# Patient Record
Sex: MALE | Race: WHITE | NOT HISPANIC OR LATINO
[De-identification: names, ages, dates, MRNs, and addresses within clinical notes are randomized per-mention and may not be internally consistent; named-entity substitution may affect disease eponyms.]

---

## 2017-02-06 ENCOUNTER — RX RENEWAL (OUTPATIENT)
Age: 67
End: 2017-02-06

## 2017-02-06 ENCOUNTER — MEDICATION RENEWAL (OUTPATIENT)
Age: 67
End: 2017-02-06

## 2017-02-10 ENCOUNTER — APPOINTMENT (OUTPATIENT)
Age: 67
End: 2017-02-10

## 2017-02-10 ENCOUNTER — APPOINTMENT (OUTPATIENT)
Dept: UROLOGY | Facility: CLINIC | Age: 67
End: 2017-02-10

## 2017-02-10 VITALS
WEIGHT: 194 LBS | DIASTOLIC BLOOD PRESSURE: 78 MMHG | BODY MASS INDEX: 27.77 KG/M2 | HEART RATE: 68 BPM | TEMPERATURE: 97.5 F | HEIGHT: 70 IN | SYSTOLIC BLOOD PRESSURE: 145 MMHG | RESPIRATION RATE: 17 BRPM

## 2017-02-12 ENCOUNTER — TRANSCRIPTION ENCOUNTER (OUTPATIENT)
Age: 67
End: 2017-02-12

## 2017-06-28 ENCOUNTER — RX RENEWAL (OUTPATIENT)
Age: 67
End: 2017-06-28

## 2017-06-28 ENCOUNTER — MEDICATION RENEWAL (OUTPATIENT)
Age: 67
End: 2017-06-28

## 2017-08-11 ENCOUNTER — APPOINTMENT (OUTPATIENT)
Dept: UROLOGY | Facility: CLINIC | Age: 67
End: 2017-08-11
Payer: MEDICARE

## 2017-08-11 VITALS
DIASTOLIC BLOOD PRESSURE: 91 MMHG | SYSTOLIC BLOOD PRESSURE: 125 MMHG | HEIGHT: 70 IN | HEART RATE: 76 BPM | WEIGHT: 195 LBS | BODY MASS INDEX: 27.92 KG/M2

## 2017-08-11 PROCEDURE — 99213 OFFICE O/P EST LOW 20 MIN: CPT

## 2017-08-15 ENCOUNTER — APPOINTMENT (OUTPATIENT)
Age: 67
End: 2017-08-15

## 2018-02-06 ENCOUNTER — APPOINTMENT (OUTPATIENT)
Age: 68
End: 2018-02-06

## 2018-02-06 ENCOUNTER — APPOINTMENT (OUTPATIENT)
Dept: UROLOGY | Facility: CLINIC | Age: 68
End: 2018-02-06
Payer: MEDICARE

## 2018-02-06 PROCEDURE — 51798 US URINE CAPACITY MEASURE: CPT

## 2018-02-06 PROCEDURE — 99213 OFFICE O/P EST LOW 20 MIN: CPT

## 2018-02-13 ENCOUNTER — RX RENEWAL (OUTPATIENT)
Age: 68
End: 2018-02-13

## 2018-06-25 ENCOUNTER — RX RENEWAL (OUTPATIENT)
Age: 68
End: 2018-06-25

## 2018-08-24 ENCOUNTER — OTHER (OUTPATIENT)
Age: 68
End: 2018-08-24

## 2018-08-24 ENCOUNTER — APPOINTMENT (OUTPATIENT)
Dept: UROLOGY | Facility: CLINIC | Age: 68
End: 2018-08-24
Payer: MEDICARE

## 2018-08-24 VITALS
SYSTOLIC BLOOD PRESSURE: 112 MMHG | WEIGHT: 194 LBS | HEART RATE: 66 BPM | BODY MASS INDEX: 27.77 KG/M2 | HEIGHT: 70 IN | DIASTOLIC BLOOD PRESSURE: 69 MMHG | TEMPERATURE: 97.5 F | RESPIRATION RATE: 17 BRPM

## 2018-08-24 PROCEDURE — 99213 OFFICE O/P EST LOW 20 MIN: CPT

## 2018-09-24 ENCOUNTER — RX RENEWAL (OUTPATIENT)
Age: 68
End: 2018-09-24

## 2018-12-19 ENCOUNTER — RX RENEWAL (OUTPATIENT)
Age: 68
End: 2018-12-19

## 2019-04-10 ENCOUNTER — RX RENEWAL (OUTPATIENT)
Age: 69
End: 2019-04-10

## 2019-08-20 ENCOUNTER — APPOINTMENT (OUTPATIENT)
Dept: UROLOGY | Facility: CLINIC | Age: 69
End: 2019-08-20
Payer: MEDICARE

## 2019-08-20 VITALS
WEIGHT: 193 LBS | HEART RATE: 77 BPM | HEIGHT: 70 IN | SYSTOLIC BLOOD PRESSURE: 120 MMHG | DIASTOLIC BLOOD PRESSURE: 85 MMHG | RESPIRATION RATE: 17 BRPM | TEMPERATURE: 97.7 F | BODY MASS INDEX: 27.63 KG/M2

## 2019-08-20 PROCEDURE — 99213 OFFICE O/P EST LOW 20 MIN: CPT

## 2019-08-22 LAB — PSA SERPL-MCNC: 1.13 NG/ML

## 2019-08-22 NOTE — ASSESSMENT
[FreeTextEntry1] : Patient is a 68 yo M here for f/u with BPH/LUTS. Previous elevated PSA has been lower and stable on recent repeats.\par \par -cont flomax and dutasteride\par -discussed with pt option for cialis daily - for BPH and ED.  Discussed with pt proper use and side effect profile, including risk of priapism, particularly since he is taking trazadone\par -after discussion he does not want to take any viagra or cialis at this time\par -d/w pt that he may have low testosterone, would consider am T level.  He is not interested in testosterone supplementation.  Therefore at this time would not pursue T levels\par -he wishes to think further\par -F/u 3 mos

## 2019-08-22 NOTE — PHYSICAL EXAM
[General Appearance - Well Nourished] : well nourished [General Appearance - Well Developed] : well developed [Normal Appearance] : normal appearance [Well Groomed] : well groomed [General Appearance - In No Acute Distress] : no acute distress [Oriented To Time, Place, And Person] : oriented to person, place, and time [Affect] : the affect was normal [Mood] : the mood was normal [Not Anxious] : not anxious

## 2019-08-22 NOTE — ADDENDUM
[FreeTextEntry1] : Also d/w pt dutasteride could potentially be source for his low libido\par He wishes trial off dutasteride - he will follow up in 4 mos to assess for change in libido\par PSA is stable

## 2019-08-22 NOTE — HISTORY OF PRESENT ILLNESS
[FreeTextEntry1] : Patient is a 66 yo M who presents for f/u with BPH, mildly elevated PSA of 4.4.  Pt is a former pt of Dr Chavis's, however due to insurance is now transferring care to me. He has a longstanding h/o BPH and elevated PSA.  He had been on alpha blockers before but did not feel they were helpful and stopped Rapaflo due to ejaculatory side effects.  He was very bothered by ejaculatory side effects from Rapaflo, however he feels that many yrs when he tried flomax he did not have as significant ejaculatory side effects.. From Dr Chavis, His prostate is @90cc in volume and in 2015 PSA 4.4 with 18% free. \par \par Pt was previously was other regimens for BPH -  rapaflo and finasteride, but experienced increased pain in perineum/pelvis, particularly with ejaculation on rapaflo and finasteride.  Did not have this on flomax bid/avodart.  But had dizziness on bid flomax.\par \par He then switched to flomax once daily and avodart.  Since then he reports no side effects on this medication regimen and excellent urinary stream and emptying.  \par \par PSA in 7/2017 is 1.2 (on dutasteride).\par PSA in 7/2018 is 1.1 (on dustateride)\par \par He is here for f/u.  Remains on flomax and avodart daily.  Reports improved LUTS - good stream except first void, good emptying. Improved frequency.  Nocturia x0-1.  Overall LUTS stable.\par \par Mild ED - reports less rigid erections, but he can maintain to completion.  No use of ED meds.  His libido is not as strong as it used to be, but mainly his erections are not as firm.  He does orgasm and issue more is penetration.  No nitrates, no CP.  He was seen by a Cardiologist - had normal stress test.  He did recently start on trazadone for sleeplessness.  He reports 3-4 nights of not sleeping.

## 2019-09-04 RX ADMIN — OXYCODONE AND ACETAMINOPHEN 2 TABLET(S): 5; 325 TABLET ORAL at 23:00

## 2019-09-29 ENCOUNTER — INPATIENT (INPATIENT)
Facility: HOSPITAL | Age: 69
LOS: 5 days | Discharge: HOME | DRG: 175 | End: 2019-10-05
Attending: HOSPITALIST | Admitting: HOSPITALIST
Payer: MEDICARE

## 2019-09-29 VITALS
WEIGHT: 190.04 LBS | OXYGEN SATURATION: 99 % | DIASTOLIC BLOOD PRESSURE: 80 MMHG | TEMPERATURE: 98 F | SYSTOLIC BLOOD PRESSURE: 115 MMHG | HEIGHT: 71 IN | HEART RATE: 107 BPM | RESPIRATION RATE: 20 BRPM

## 2019-09-29 DIAGNOSIS — I26.02 SADDLE EMBOLUS OF PULMONARY ARTERY WITH ACUTE COR PULMONALE: ICD-10-CM

## 2019-09-29 LAB
ALBUMIN SERPL ELPH-MCNC: 3.7 G/DL — SIGNIFICANT CHANGE UP (ref 3.3–5.2)
ALP SERPL-CCNC: 58 U/L — SIGNIFICANT CHANGE UP (ref 40–120)
ALT FLD-CCNC: 29 U/L — SIGNIFICANT CHANGE UP
ANION GAP SERPL CALC-SCNC: 12 MMOL/L — SIGNIFICANT CHANGE UP (ref 5–17)
APTT BLD: 24.4 SEC — LOW (ref 27.5–36.3)
AST SERPL-CCNC: 28 U/L — SIGNIFICANT CHANGE UP
BILIRUB SERPL-MCNC: 0.3 MG/DL — LOW (ref 0.4–2)
BLD GP AB SCN SERPL QL: SIGNIFICANT CHANGE UP
BUN SERPL-MCNC: 18 MG/DL — SIGNIFICANT CHANGE UP (ref 8–20)
CALCIUM SERPL-MCNC: 8.9 MG/DL — SIGNIFICANT CHANGE UP (ref 8.6–10.2)
CHLORIDE SERPL-SCNC: 103 MMOL/L — SIGNIFICANT CHANGE UP (ref 98–107)
CO2 SERPL-SCNC: 21 MMOL/L — LOW (ref 22–29)
CREAT SERPL-MCNC: 1.06 MG/DL — SIGNIFICANT CHANGE UP (ref 0.5–1.3)
GLUCOSE SERPL-MCNC: 126 MG/DL — HIGH (ref 70–115)
HCT VFR BLD CALC: 45.7 % — SIGNIFICANT CHANGE UP (ref 39–50)
HGB BLD-MCNC: 15.2 G/DL — SIGNIFICANT CHANGE UP (ref 13–17)
INR BLD: 1.03 RATIO — SIGNIFICANT CHANGE UP (ref 0.88–1.16)
MCHC RBC-ENTMCNC: 30.6 PG — SIGNIFICANT CHANGE UP (ref 27–34)
MCHC RBC-ENTMCNC: 33.3 GM/DL — SIGNIFICANT CHANGE UP (ref 32–36)
MCV RBC AUTO: 92 FL — SIGNIFICANT CHANGE UP (ref 80–100)
NT-PROBNP SERPL-SCNC: 31 PG/ML — SIGNIFICANT CHANGE UP (ref 0–300)
PLATELET # BLD AUTO: 219 K/UL — SIGNIFICANT CHANGE UP (ref 150–400)
POTASSIUM SERPL-MCNC: 4.2 MMOL/L — SIGNIFICANT CHANGE UP (ref 3.5–5.3)
POTASSIUM SERPL-SCNC: 4.2 MMOL/L — SIGNIFICANT CHANGE UP (ref 3.5–5.3)
PROT SERPL-MCNC: 6.9 G/DL — SIGNIFICANT CHANGE UP (ref 6.6–8.7)
PROTHROM AB SERPL-ACNC: 11.8 SEC — SIGNIFICANT CHANGE UP (ref 10–12.9)
RBC # BLD: 4.97 M/UL — SIGNIFICANT CHANGE UP (ref 4.2–5.8)
RBC # FLD: 13.2 % — SIGNIFICANT CHANGE UP (ref 10.3–14.5)
SODIUM SERPL-SCNC: 136 MMOL/L — SIGNIFICANT CHANGE UP (ref 135–145)
TROPONIN T SERPL-MCNC: 0.08 NG/ML — HIGH (ref 0–0.06)
WBC # BLD: 12.86 K/UL — HIGH (ref 3.8–10.5)
WBC # FLD AUTO: 12.86 K/UL — HIGH (ref 3.8–10.5)

## 2019-09-29 PROCEDURE — 99232 SBSQ HOSP IP/OBS MODERATE 35: CPT

## 2019-09-29 PROCEDURE — 93010 ELECTROCARDIOGRAM REPORT: CPT

## 2019-09-29 PROCEDURE — 70450 CT HEAD/BRAIN W/O DYE: CPT | Mod: 26

## 2019-09-29 PROCEDURE — 99291 CRITICAL CARE FIRST HOUR: CPT

## 2019-09-29 PROCEDURE — 71045 X-RAY EXAM CHEST 1 VIEW: CPT | Mod: 26

## 2019-09-29 PROCEDURE — 71275 CT ANGIOGRAPHY CHEST: CPT | Mod: 26

## 2019-09-29 PROCEDURE — 99221 1ST HOSP IP/OBS SF/LOW 40: CPT

## 2019-09-29 RX ORDER — SODIUM CHLORIDE 9 MG/ML
1000 INJECTION INTRAMUSCULAR; INTRAVENOUS; SUBCUTANEOUS ONCE
Refills: 0 | Status: COMPLETED | OUTPATIENT
Start: 2019-09-29 | End: 2019-09-29

## 2019-09-29 RX ORDER — HEPARIN SODIUM 5000 [USP'U]/ML
6500 INJECTION INTRAVENOUS; SUBCUTANEOUS ONCE
Refills: 0 | Status: COMPLETED | OUTPATIENT
Start: 2019-09-29 | End: 2019-09-29

## 2019-09-29 RX ORDER — HEPARIN SODIUM 5000 [USP'U]/ML
7000 INJECTION INTRAVENOUS; SUBCUTANEOUS EVERY 6 HOURS
Refills: 0 | Status: DISCONTINUED | OUTPATIENT
Start: 2019-09-29 | End: 2019-09-29

## 2019-09-29 RX ORDER — HEPARIN SODIUM 5000 [USP'U]/ML
7000 INJECTION INTRAVENOUS; SUBCUTANEOUS ONCE
Refills: 0 | Status: DISCONTINUED | OUTPATIENT
Start: 2019-09-29 | End: 2019-09-29

## 2019-09-29 RX ORDER — HEPARIN SODIUM 5000 [USP'U]/ML
INJECTION INTRAVENOUS; SUBCUTANEOUS
Qty: 25000 | Refills: 0 | Status: DISCONTINUED | OUTPATIENT
Start: 2019-09-29 | End: 2019-09-29

## 2019-09-29 RX ORDER — TAMSULOSIN HYDROCHLORIDE 0.4 MG/1
1 CAPSULE ORAL
Qty: 0 | Refills: 0 | DISCHARGE

## 2019-09-29 RX ORDER — CHLORHEXIDINE GLUCONATE 213 G/1000ML
1 SOLUTION TOPICAL
Refills: 0 | Status: DISCONTINUED | OUTPATIENT
Start: 2019-09-29 | End: 2019-09-29

## 2019-09-29 RX ORDER — OMEPRAZOLE 10 MG/1
1 CAPSULE, DELAYED RELEASE ORAL
Qty: 0 | Refills: 0 | DISCHARGE

## 2019-09-29 RX ORDER — HEPARIN SODIUM 5000 [USP'U]/ML
INJECTION INTRAVENOUS; SUBCUTANEOUS
Qty: 25000 | Refills: 0 | Status: DISCONTINUED | OUTPATIENT
Start: 2019-09-29 | End: 2019-09-30

## 2019-09-29 RX ORDER — ROSUVASTATIN CALCIUM 5 MG/1
1 TABLET ORAL
Qty: 0 | Refills: 0 | DISCHARGE

## 2019-09-29 RX ORDER — HEPARIN SODIUM 5000 [USP'U]/ML
6500 INJECTION INTRAVENOUS; SUBCUTANEOUS EVERY 6 HOURS
Refills: 0 | Status: DISCONTINUED | OUTPATIENT
Start: 2019-09-29 | End: 2019-09-30

## 2019-09-29 RX ORDER — HEPARIN SODIUM 5000 [USP'U]/ML
3000 INJECTION INTRAVENOUS; SUBCUTANEOUS EVERY 6 HOURS
Refills: 0 | Status: DISCONTINUED | OUTPATIENT
Start: 2019-09-29 | End: 2019-09-30

## 2019-09-29 RX ORDER — HEPARIN SODIUM 5000 [USP'U]/ML
3500 INJECTION INTRAVENOUS; SUBCUTANEOUS EVERY 6 HOURS
Refills: 0 | Status: DISCONTINUED | OUTPATIENT
Start: 2019-09-29 | End: 2019-09-29

## 2019-09-29 RX ORDER — DUTASTERIDE 0.5 MG/1
0.8 CAPSULE, LIQUID FILLED ORAL
Qty: 0 | Refills: 0 | DISCHARGE

## 2019-09-29 RX ORDER — TRAZODONE HCL 50 MG
0 TABLET ORAL
Qty: 0 | Refills: 0 | DISCHARGE

## 2019-09-29 RX ADMIN — HEPARIN SODIUM 1500 UNIT(S)/HR: 5000 INJECTION INTRAVENOUS; SUBCUTANEOUS at 21:21

## 2019-09-29 RX ADMIN — HEPARIN SODIUM 6500 UNIT(S): 5000 INJECTION INTRAVENOUS; SUBCUTANEOUS at 21:19

## 2019-09-29 RX ADMIN — SODIUM CHLORIDE 1000 MILLILITER(S): 9 INJECTION INTRAMUSCULAR; INTRAVENOUS; SUBCUTANEOUS at 21:00

## 2019-09-29 RX ADMIN — SODIUM CHLORIDE 1000 MILLILITER(S): 9 INJECTION INTRAMUSCULAR; INTRAVENOUS; SUBCUTANEOUS at 21:38

## 2019-09-29 NOTE — ED PROVIDER NOTE - PHYSICAL EXAMINATION
General:     NAD  Head:     NC/AT, EOMI, oral mucosa moist  Neck:     trachea midline  Lungs:     CTA b/l, no w/r/r  CVS:     S1S2, tachycardic, no m/g/r  Abd:     +BS, s/nt/nd, no organomegaly  Ext:    2+ radial and pedal pulses, no c/c/e  Neuro: AAOx3, no sensory/motor deficits

## 2019-09-29 NOTE — CONSULT NOTE ADULT - PROBLEM SELECTOR RECOMMENDATION 9
-admit to MICU  -currently hemodynamically stable, oxygenating well on nasal cannula  -Alteplase infusion followed by heparin gtt  -TTE in am  -b/l LE venous dopplers

## 2019-09-29 NOTE — ED PROVIDER NOTE - CLINICAL SUMMARY MEDICAL DECISION MAKING FREE TEXT BOX
Patient arrived with near syncope, cp/sob. found to have saddle embolus, called MICU and Dr. Ballard for EKOS.  patient to be admitted to MICU and possible candidate for EKOS/TPA

## 2019-09-29 NOTE — ED ADULT NURSE NOTE - NEURO WDL
Alert and oriented to person, place and time, memory intact, behavior appropriate to situation, PERRL.
unsure

## 2019-09-29 NOTE — CONSULT NOTE ADULT - SUBJECTIVE AND OBJECTIVE BOX
History obtained by: Patient and medical record     obtained: No    Chief complaint:  "I was dizzy and couldn't breathe"    HPI:  This is 67 y/o male with hx of HTN, HLD, BPH, GERD who presents with dizziness and labored breathing. Pt was cooking dinner outside on the grill when he suddenly felt dizzy and stumbled back to the kitchen. He went down on his knees and felt SOB and diaphoretic, denies chest pain but endorses mild pressure around his neck. In the ER pt found tachycardic to 110's and mildly hypoxemic, troponin 0.08. Chest CTA revealed extensive pulmonary emboli with right heart strain. Dr. Ballard contacted and recommended thrombolysis with TPA.     Pt has been feeling very tired recently and had a low grade fever last week. He's been having non-productive cough for several weeks. Pt denies chest pain, palpitations or syncope, no hx of clotting disorders. He recently had a cardiac workup at [facility] including stress test and echocardiogram (reportedly normal). Pt has been on multiply flights over the past few months, last trip about 10 days ago. He also c/o new LLE pain.      REVIEW OF SYMPTOMS:   Cardiovascular:  as per HPI  Respiratory:  c/o dyspnea and cough,     Genitourinary:  No dysuria, no hematuria;   Gastrointestinal:   No dark color stool, no melena, no diarrhea, no constipation, no abdominal pain;   Neurological: No headache, no slurred speech, c/o dizziness  Psychiatric: No agitation, no anxiety.  ALL OTHER REVIEW OF SYSTEMS ARE NEGATIVE.    MEDICATIONS  (home):  Omeprazole 40 mg  Lisinopril 20 mg  Crestor 20 mg  ASA 81 mg  Dutasteride 0.8 mg  Flomax 0.4 mg  Trazodone 50 mg        PAST MEDICAL & SURGICAL HISTORY:  Hypertension      FAMILY HISTORY: father MI at 80 y/o      SOCIAL HISTORY: retired, lives with wife, exercises regularly at the gym    CIGARETTES: smoked for a few years in [place], quit 45 years ago    ALCOHOL: 1 martini a day    DRUGS: denies    Vital Signs Last 24 Hrs  T(C): 36.4 (29 Sep 2019 18:58), Max: 36.4 (29 Sep 2019 18:58)  T(F): 97.6 (29 Sep 2019 18:58), Max: 97.6 (29 Sep 2019 18:58)  HR: 120 (29 Sep 2019 20:54) (107 - 126)  BP: 114/78 (29 Sep 2019 20:54) (97/60 - 115/80)  BP(mean): --  RR: 20 (29 Sep 2019 20:54) (16 - 21)  SpO2: 98% (29 Sep 2019 20:54) (93% - 99%)    PHYSICAL EXAM:  General: WN/WD NAD  Neurology: A&Ox3, nonfocal, FRASER x 4  Eyes: PERRL/ EOMI, Gross vision intact  ENT/Neck: Neck supple, trachea midline, No JVD, Gross hearing intact  Respiratory: CTA B/L, No wheezing, rales, rhonchi  CV: tachycardia, RRR, S1S2, no gross murmurs  Abdominal: Soft, NT, ND +BS,   Extremities: No edema, + peripheral pulses  Skin: No Rashes, Hematoma, Ecchymosis        INTERPRETATION OF TELEMETRY: 's    ECG:    I&O's Detail      LABS:                        15.2   12.86 )-----------( 219      ( 29 Sep 2019 19:46 )             45.7     09-29    136  |  103  |  18.0  ----------------------------<  126<H>  4.2   |  21.0<L>  |  1.06    Ca    8.9      29 Sep 2019 19:46    TPro  6.9  /  Alb  3.7  /  TBili  0.3<L>  /  DBili  x   /  AST  28  /  ALT  29  /  AlkPhos  58  09-29    CARDIAC MARKERS ( 29 Sep 2019 19:46 )  x     / 0.08 ng/mL / x     / x     / x          PT/INR - ( 29 Sep 2019 19:46 )   PT: 11.8 sec;   INR: 1.03 ratio         PTT - ( 29 Sep 2019 19:46 )  PTT:24.4 sec    I&O's Summary      RADIOLOGY & ADDITIONAL STUDIES:   < from: CT Angio Chest w/ IV Cont (09.29.19 @ 20:43) >  IMPRESSION:  Saddle pulmonaryembolism with extensive bilateral pulmonary emboli.   Right heart strain. No evidence of pulmonary infarct at this time.    I discussed the findings with Dr. Celestin at 8:48 PM on 9/29/2019 with read   back verification.      ADAM PRIETO M.D., RADIOLOGIST  This document has been electronically signed. Sep 29 2019  8:52PM    < end of copied text >     PREVIOUS DIAGNOSTIC TESTING:      ECHO: n/a      STRESS: n/a        CATHETERIZATION: n/a

## 2019-09-29 NOTE — CONSULT NOTE ADULT - ATTENDING COMMENTS
Patient with massive PE based on hypotension, hypoxemia, near syncopal episode and significant size of thrombus.   Decision to give full dose TPA.   Patient tolerated well. No bleeding events.   Plan for eliquis 10 mg po bid with 2 hour heparin overlap.   Change to eliquis 5 mg po bid after initial loading dose for 7 days.

## 2019-09-29 NOTE — ED ADULT NURSE NOTE - OBJECTIVE STATEMENT
ASsumed care of patient at 1945, alert and oriented x4, c/o dizziness at home with near syncopal episode. Pt reports he became SOB and "couldn't stand". Denies LOC, denies chest pains. Pt on CM, sinus tachycardia noted. Sp02 98% on 2L NC. No respiratory distress noted at this time. Pt reports he traveled to [facility] in may and was SOB while on vacation. PT seen by MD. IV placed labs sent IVF infusing. Wife at bedside. pt educated on plan of care, pt able to successfully teach back plan of care to RN, RN will continue to reeducate pt during hospital stay.

## 2019-09-29 NOTE — ED PROVIDER NOTE - NS ED ROS FT
Constitutional: (-) fever  (-)chills  (-)sweats  Eyes/ENT: (-) blurry vision, (-) epistaxis  (-)rhinorrhea   (-) sore throat    Cardiovascular: (+) chest pain, (+) palpitations (-) edema   Respiratory: (-) cough, (+) shortness of breath   Gastrointestinal: (-)nausea  (-)vomiting, (-) diarrhea  (-) abdominal pain   :  (-)dysuria, (-)frequency, (-)urgency, (-)hematuria  Musculoskeletal: (-) neck pain, (-) back pain, (-) joint pain  Integumentary: (-) rash, (-) edema  Neurological: (-) headache, (-) altered mental status  (-)LOC +near syncope

## 2019-09-29 NOTE — ED ADULT TRIAGE NOTE - CHIEF COMPLAINT QUOTE
pt a+ox3, reports sudden onset of dizziness after working outside. pt denies syncope, LOC, hitting head. pt reports feeling weak, reports fever last week but states he has been feeling fine since.

## 2019-09-29 NOTE — ED ADULT NURSE REASSESSMENT NOTE - NS ED NURSE REASSESS COMMENT FT1
Pt to critical care after CT. + PE on scan. Report given to Critical care RN Roberto.
Pt A&Ox4 c/o having a near syncope episode and SOB at this time. Pt resting comfortably, VSS, no signs of distress at this time, CM in place ST, Heparin started as ordered, PTT/INR @ 250, awaiting consult/ admission, safety maintained, call bell in reach.

## 2019-09-29 NOTE — ED PROVIDER NOTE - OBJECTIVE STATEMENT
68yoM; with pmh signif for HTN; now p/w near-syncope. patient states he has had sinus infection for 1 week, placed on doxycycline and steroids.  states today he was squatting to fix something and when he stood up he felt vertiginous and had near syncope. states he felt diaphoretic, nausea, chest pain and sob.  chest pain--sscp, pressure, associated with sob.  denies back pain. denies paresthesia. denies focal weakness. denies abd pain. denies smoking. denies trauma or recent surgery.  reports traveling to [facility] in May.  PMH: HTN  SOCIAL: No tobacco/illicit substance use/socialEtOH 68yoM; with pmh signif for HTN; now p/w near-syncope. patient states he has had sinus infection for 1 week, placed on doxycycline and steroids.  states today he was squatting to fix something and when he stood up he felt vertiginous and had near syncope. Near syncope associated with diaphoretic, nausea, chest pain and sob.  chest pain--sscp, pressure, associated with sob.  denies back pain. denies paresthesia. denies focal weakness. denies abd pain. denies smoking. denies trauma or recent surgery.  reports traveling to [facility] in May.  PMH: HTN  SOCIAL: No tobacco/illicit substance use/socialEtOH

## 2019-09-30 DIAGNOSIS — I26.09 OTHER PULMONARY EMBOLISM WITH ACUTE COR PULMONALE: ICD-10-CM

## 2019-09-30 DIAGNOSIS — I82.409 ACUTE EMBOLISM AND THROMBOSIS OF UNSPECIFIED DEEP VEINS OF UNSPECIFIED LOWER EXTREMITY: ICD-10-CM

## 2019-09-30 DIAGNOSIS — R09.89 OTHER SPECIFIED SYMPTOMS AND SIGNS INVOLVING THE CIRCULATORY AND RESPIRATORY SYSTEMS: ICD-10-CM

## 2019-09-30 DIAGNOSIS — R05 COUGH: ICD-10-CM

## 2019-09-30 DIAGNOSIS — I26.99 OTHER PULMONARY EMBOLISM WITHOUT ACUTE COR PULMONALE: ICD-10-CM

## 2019-09-30 LAB
ABO RH CONFIRMATION: SIGNIFICANT CHANGE UP
APTT BLD: 159.6 SEC — CRITICAL HIGH (ref 27.5–36.3)
APTT BLD: 34.4 SEC — SIGNIFICANT CHANGE UP (ref 27.5–36.3)
APTT BLD: 72.2 SEC — HIGH (ref 27.5–36.3)
HCT VFR BLD CALC: 44.6 % — SIGNIFICANT CHANGE UP (ref 39–50)
HCT VFR BLD CALC: 44.8 % — SIGNIFICANT CHANGE UP (ref 39–50)
HCV AB S/CO SERPL IA: 0.16 S/CO — SIGNIFICANT CHANGE UP (ref 0–0.99)
HCV AB SERPL-IMP: SIGNIFICANT CHANGE UP
HGB BLD-MCNC: 14.7 G/DL — SIGNIFICANT CHANGE UP (ref 13–17)
HGB BLD-MCNC: 14.8 G/DL — SIGNIFICANT CHANGE UP (ref 13–17)
INR BLD: 1.08 RATIO — SIGNIFICANT CHANGE UP (ref 0.88–1.16)
MCHC RBC-ENTMCNC: 30.1 PG — SIGNIFICANT CHANGE UP (ref 27–34)
MCHC RBC-ENTMCNC: 30.4 PG — SIGNIFICANT CHANGE UP (ref 27–34)
MCHC RBC-ENTMCNC: 33 GM/DL — SIGNIFICANT CHANGE UP (ref 32–36)
MCHC RBC-ENTMCNC: 33 GM/DL — SIGNIFICANT CHANGE UP (ref 32–36)
MCV RBC AUTO: 91.2 FL — SIGNIFICANT CHANGE UP (ref 80–100)
MCV RBC AUTO: 92 FL — SIGNIFICANT CHANGE UP (ref 80–100)
PLATELET # BLD AUTO: 239 K/UL — SIGNIFICANT CHANGE UP (ref 150–400)
PLATELET # BLD AUTO: 240 K/UL — SIGNIFICANT CHANGE UP (ref 150–400)
PROTHROM AB SERPL-ACNC: 12.5 SEC — SIGNIFICANT CHANGE UP (ref 10–12.9)
RBC # BLD: 4.87 M/UL — SIGNIFICANT CHANGE UP (ref 4.2–5.8)
RBC # BLD: 4.89 M/UL — SIGNIFICANT CHANGE UP (ref 4.2–5.8)
RBC # FLD: 13.4 % — SIGNIFICANT CHANGE UP (ref 10.3–14.5)
RBC # FLD: 13.5 % — SIGNIFICANT CHANGE UP (ref 10.3–14.5)
WBC # BLD: 12.78 K/UL — HIGH (ref 3.8–10.5)
WBC # BLD: 14 K/UL — HIGH (ref 3.8–10.5)
WBC # FLD AUTO: 12.78 K/UL — HIGH (ref 3.8–10.5)
WBC # FLD AUTO: 14 K/UL — HIGH (ref 3.8–10.5)

## 2019-09-30 PROCEDURE — 99223 1ST HOSP IP/OBS HIGH 75: CPT

## 2019-09-30 PROCEDURE — 93931 UPPER EXTREMITY STUDY: CPT | Mod: 26,RT

## 2019-09-30 PROCEDURE — 93971 EXTREMITY STUDY: CPT | Mod: 26,RT

## 2019-09-30 PROCEDURE — 93970 EXTREMITY STUDY: CPT | Mod: 26

## 2019-09-30 RX ORDER — HEPARIN SODIUM 5000 [USP'U]/ML
3000 INJECTION INTRAVENOUS; SUBCUTANEOUS EVERY 6 HOURS
Refills: 0 | Status: DISCONTINUED | OUTPATIENT
Start: 2019-09-30 | End: 2019-09-30

## 2019-09-30 RX ORDER — HEPARIN SODIUM 5000 [USP'U]/ML
3000 INJECTION INTRAVENOUS; SUBCUTANEOUS EVERY 6 HOURS
Refills: 0 | Status: DISCONTINUED | OUTPATIENT
Start: 2019-09-30 | End: 2019-10-01

## 2019-09-30 RX ORDER — ALPRAZOLAM 0.25 MG
0.25 TABLET ORAL EVERY 8 HOURS
Refills: 0 | Status: DISCONTINUED | OUTPATIENT
Start: 2019-09-30 | End: 2019-10-05

## 2019-09-30 RX ORDER — HEPARIN SODIUM 5000 [USP'U]/ML
6500 INJECTION INTRAVENOUS; SUBCUTANEOUS EVERY 6 HOURS
Refills: 0 | Status: DISCONTINUED | OUTPATIENT
Start: 2019-09-30 | End: 2019-09-30

## 2019-09-30 RX ORDER — ALTEPLASE 100 MG
100 KIT INTRAVENOUS ONCE
Refills: 0 | Status: COMPLETED | OUTPATIENT
Start: 2019-09-30 | End: 2019-09-30

## 2019-09-30 RX ORDER — ACETAMINOPHEN 500 MG
650 TABLET ORAL ONCE
Refills: 0 | Status: COMPLETED | OUTPATIENT
Start: 2019-09-30 | End: 2019-09-30

## 2019-09-30 RX ORDER — HEPARIN SODIUM 5000 [USP'U]/ML
INJECTION INTRAVENOUS; SUBCUTANEOUS
Qty: 25000 | Refills: 0 | Status: DISCONTINUED | OUTPATIENT
Start: 2019-09-30 | End: 2019-09-30

## 2019-09-30 RX ORDER — INFLUENZA VIRUS VACCINE 15; 15; 15; 15 UG/.5ML; UG/.5ML; UG/.5ML; UG/.5ML
0.5 SUSPENSION INTRAMUSCULAR ONCE
Refills: 0 | Status: COMPLETED | OUTPATIENT
Start: 2019-09-30 | End: 2019-09-30

## 2019-09-30 RX ORDER — SIMETHICONE 80 MG/1
80 TABLET, CHEWABLE ORAL EVERY 8 HOURS
Refills: 0 | Status: DISCONTINUED | OUTPATIENT
Start: 2019-09-30 | End: 2019-10-05

## 2019-09-30 RX ORDER — OXYCODONE AND ACETAMINOPHEN 5; 325 MG/1; MG/1
1 TABLET ORAL ONCE
Refills: 0 | Status: DISCONTINUED | OUTPATIENT
Start: 2019-09-30 | End: 2019-09-30

## 2019-09-30 RX ORDER — HEPARIN SODIUM 5000 [USP'U]/ML
5000 INJECTION INTRAVENOUS; SUBCUTANEOUS ONCE
Refills: 0 | Status: COMPLETED | OUTPATIENT
Start: 2019-09-30 | End: 2019-09-30

## 2019-09-30 RX ORDER — HEPARIN SODIUM 5000 [USP'U]/ML
1100 INJECTION INTRAVENOUS; SUBCUTANEOUS
Qty: 25000 | Refills: 0 | Status: DISCONTINUED | OUTPATIENT
Start: 2019-09-30 | End: 2019-10-01

## 2019-09-30 RX ORDER — HEPARIN SODIUM 5000 [USP'U]/ML
6500 INJECTION INTRAVENOUS; SUBCUTANEOUS ONCE
Refills: 0 | Status: DISCONTINUED | OUTPATIENT
Start: 2019-09-30 | End: 2019-09-30

## 2019-09-30 RX ORDER — HEPARIN SODIUM 5000 [USP'U]/ML
6500 INJECTION INTRAVENOUS; SUBCUTANEOUS ONCE
Refills: 0 | Status: DISCONTINUED | OUTPATIENT
Start: 2019-09-30 | End: 2019-10-01

## 2019-09-30 RX ADMIN — ALTEPLASE 50 MILLIGRAM(S): KIT at 01:03

## 2019-09-30 RX ADMIN — OXYCODONE AND ACETAMINOPHEN 1 TABLET(S): 5; 325 TABLET ORAL at 22:56

## 2019-09-30 RX ADMIN — HEPARIN SODIUM 1100 UNIT(S)/HR: 5000 INJECTION INTRAVENOUS; SUBCUTANEOUS at 03:08

## 2019-09-30 RX ADMIN — HEPARIN SODIUM 1500 UNIT(S)/HR: 5000 INJECTION INTRAVENOUS; SUBCUTANEOUS at 11:58

## 2019-09-30 RX ADMIN — OXYCODONE AND ACETAMINOPHEN 1 TABLET(S): 5; 325 TABLET ORAL at 21:56

## 2019-09-30 RX ADMIN — Medication 0.25 MILLIGRAM(S): at 22:27

## 2019-09-30 RX ADMIN — Medication 650 MILLIGRAM(S): at 20:00

## 2019-09-30 RX ADMIN — Medication 650 MILLIGRAM(S): at 19:00

## 2019-09-30 RX ADMIN — HEPARIN SODIUM 5000 UNIT(S): 5000 INJECTION INTRAVENOUS; SUBCUTANEOUS at 11:58

## 2019-09-30 NOTE — PROVIDER CONTACT NOTE (EICU) - SITUATION
Pt -68 y M with Hx HTN, dyslipidemia presented after near syncope  at home. In ER found to be hypotensive, hypoxemic. He was IVF volume resuscitated in ER. Chest CT scan revealed large saddle embolus with sign of RV strain. In view of hemodynamic instability in ER- and after discussion with interventional cardiology it was decided to give pt TPA. Pt admitted to ICU post TPA
68M admitted with pulmonary embolism S/P EKOS/tPA , now c/o sevre right arm pain. Initailly he thought it was from blood pressure cuff alyse rm, however cuff was switched to left arm and right arm pain remains. Tylenol does not help

## 2019-09-30 NOTE — H&P ADULT - NSHPPHYSICALEXAM_GEN_ALL_CORE
GENERAL: Pleasant male lying in stretcher in NAD, alert, oriented, reactive  HEENT: PERRL, no scleral icterus or pale conjunctiva  CV: Sinus tachy 110-120's, no murmurs, +s1/s2, no JVD  PULM: Lungs clear to auscultation b/l, no rales, rhonchi or wheezing, cough without sputum production during exam  GI: Soft, nondistended, non tender, normoactive bowel sounds  MSK: No erthema/swelling  EXT: No edema  SKIN: Warm, dry   NEURO: A&Ox3, no focal deficits Vital Signs Last 24 Hrs  T(C): 36.6 (29 Sep 2019 23:35), Max: 36.6 (29 Sep 2019 23:35)  T(F): 97.8 (29 Sep 2019 23:35), Max: 97.8 (29 Sep 2019 23:35)  HR: 108 (30 Sep 2019 00:00) (107 - 126)  BP: 113/82 (30 Sep 2019 00:00) (97/60 - 115/80)  BP(mean): 94 (30 Sep 2019 00:00) (94 - 94)  RR: 22 (30 Sep 2019 00:00) (16 - 22)  SpO2: 98% (30 Sep 2019 00:00) (93% - 99%)      GENERAL: Pleasant male lying in stretcher in NAD, alert, oriented, reactive  HEENT: PERRL, no scleral icterus or pale conjunctiva  CV: Sinus tachy 110-120's, no murmurs, +s1/s2, no JVD  PULM: Lungs clear to auscultation b/l, no rales, rhonchi or wheezing, cough without sputum production during exam  GI: Soft, nondistended, non tender, normoactive bowel sounds  MSK: No erthema/swelling  EXT: No edema, no swelling  SKIN: Warm, dry   NEURO: A&Ox3, no focal deficits

## 2019-09-30 NOTE — H&P ADULT - NSHPSOCIALHISTORY_GEN_ALL_CORE
Remote history of tobacco use for a "couple years" in 20's, social drinker, no illicit drug use Remote history of tobacco use for a "couple years" in 20's, social drinker, no illicit drug use    Retired, lives at home with wife

## 2019-09-30 NOTE — H&P ADULT - HISTORY OF PRESENT ILLNESS
Pt is a 67 y/o male with PMHx HTN, HLD, BPH presents from home after near-syncopal episode. Pt endorses feeling weak past few days, with worsening MURILLO, culminating in a near-syncopal episode last night. Pt states he never lost consciousness but "almost blacked out" and needed help from wife to arise from knees. Pt also endorsed SOB last night. In ED, pt with continued SOB, found to be hypoxemic and borderline hypotensive with SBP in 90's, and tachycardic in 120's. Pt received CTA in ED, found to have saddle PE with right heart strain and extensive b/l emboli. Pt started on heparin drip, placed on 2L N/C. Normostensive s/p 1L NS with SBP in 120's. MICU Consult called. On arrival, pt with NC out of nose with SPO2 99%, sinus tachy 110-120's, normotensive with /80. Pt with continued SOB. Denies CP, n/v/d, h/a, dizziness, weakness, abd pain or any other complaint at this time. 68M with PMHx of HTN, HLD, BPH who presented from home BIBEMS with near-syncope and severe dyspnea. Pt states he has not been feeling well for a few days. Pt seen at [facility] earlier in week with c/o of groin pain. States he was told he had an inguinal hernia which they reduced with some improvement in pain, although he admits to a cough and some SOB for past week or so. Further questioning of pt, he admits to multiple flights over past few months with most recent flight approximately 10 days ago. Yesterday with acutely worsening SOB, diaphoretic, near syncope where he almost blacked out while attempting to start his grill. Following event wife called EMS and pt was brought to ED. Upon presentation to ED found to hypoxemic and borderline hypotensive with SBP in 90's, and tachycardic in 120's.  CTA performed and revealed a large saddle PE with right heart strain and extensive b/l emboli. Pt started on heparin drip, placed on 2L N/C. ICU was consulted along with PERT team for further management.

## 2019-09-30 NOTE — H&P ADULT - ASSESSMENT
Pt is a 69 y/o male with PMHx HTN, HLD, BPH here with:    1. Pulmonary Embolism- saddle PE with extensive b/l emboli with right heart strain, +trops    Plan:  -D/w Dr. Edmar Ballard, Interventional Cardiologist and eICU attending. Given clot burden and "massive" PE criteria, decision made to administer 100mg systemic tPA via peripheral IV.   -Started on tPA, will receive 100mg IVPB over 2 hours  -Transition to full a/c with Heparin nomogram once tPA infusion is done  -on 2L NC, SPO2 99%. Will continue to titrate supplemental O2 therapy, maintaining SPO2 >92% Pt is a 69 y/o male with PMHx HTN, HLD, BPH here with:    1. Pulmonary Embolism- saddle PE with extensive b/l emboli with right heart strain, +trops    Plan:  -D/w Dr. Edmar Ballard, Interventional Cardiologist and eICU attending. Given clot burden and "massive" PE criteria, decision made to administer 100mg systemic tPA via peripheral IV.   -Started on tPA, will receive 100mg IVPB over 2 hours  -Transition to full a/c with Heparin nomogram once tPA infusion is done  -on 2L NC, SPO2 99%. Will continue to titrate supplemental O2 therapy, maintaining SPO2 >92%  -TTE pending  -Repeat Trop/BNP w/ AM labs Pt is a 69 y/o male with PMHx HTN, HLD, BPH here with:    1. Pulmonary Embolism- saddle PE with extensive b/l emboli with right heart strain, +trops    Plan:  -D/w Dr. Edmar Ballard, Interventional Cardiologist and eICU attending. Given clot burden and "massive" PE criteria, decision made to administer 100mg systemic tPA via peripheral IV.   -Started on tPA, will receive 100mg IVPB over 2 hours  -Transition to full a/c with Heparin nomogram once tPA infusion is done  -on 2L NC, SPO2 99%. Will continue to titrate supplemental O2 therapy, maintaining SPO2 >92%  -TTE pending  -Cardiology following Pt is a 67 y/o male with PMHx HTN, HLD, BPH here with:    1. acute Pulmonary Embolism (Saddle ) with cor pulmonale- +trop, CTA with R heart strain  2. hypoxia -currently stable on 2 liters    Plan:  1. IV tPA 100mg over 2hours now  2. placement back on IV heparin with PE protocol nomogram following tPA, aggressive management to achieve therapeutic PTT  3. close monitoring with tele  4. repeat TTE in am following tPA  5. check LE duplex  6. titrating FiO2 for sats>90%, if decompensates will trial with HFNC terri attempts to avoid NIPPV and intubation given his RV strain and potential for worsening RV overload with +pressure   7. follow up with Cardiology  8. possible hematology consult             Critical Care time: 45mins assessing presenting problems of acute illness that poses high probability of life threatening deterioration or end organ damage/dysfunction.  Medical decision making including Initiating plan of care, reviewing data, reviewing radiology, direct patient bedside evaluation and interpretation of vital signs, any necessary ventilator management , discussion with multidisciplinary team, discussing goals of care with patient/family, all non inclusive of procedures Pt is a 69 y/o male with PMHx HTN, HLD, BPH here with:    1. acute Pulmonary Embolism (Saddle ) with cor pulmonale- +trop, CTA with R heart strain  2. hypoxia -currently stable on 2 liters    Plan:  1. IV tPA 100mg over 2hours now, discussed with eICU attending and PERT consult Dr Gondatra who are both in agreement  2. placement back on IV heparin with PE protocol nomogram following tPA, aggressive management to achieve therapeutic PTT  3. close monitoring with tele  4. repeat TTE in am following tPA  5. check LE duplex  6. titrating FiO2 for sats>90%, if decompensates will trial with HFNC terri attempts to avoid NIPPV and intubation given his RV strain and potential for worsening RV overload with +pressure   7. follow up with Cardiology  8. possible hematology consult       Case and plan discussed with eICU attending.      Critical Care time: 45mins assessing presenting problems of acute illness that poses high probability of life threatening deterioration or end organ damage/dysfunction.  Medical decision making including Initiating plan of care, reviewing data, reviewing radiology, direct patient bedside evaluation and interpretation of vital signs, any necessary ventilator management , discussion with multidisciplinary team, discussing goals of care with patient/family, all non inclusive of procedures Pt is a 67 y/o male with PMHx HTN, HLD, BPH here with:    1. acute Pulmonary Embolism (Saddle ) with cor pulmonale- +trop, CTA with R heart strain  2. hypoxia/SOB -currently stable on 2 liters    Plan:  1. IV tPA 100mg over 2hours now, discussed with eICU attending and PERT consult Dr Gondatra who are both in agreement  2. placement back on IV heparin with PE protocol nomogram following tPA, aggressive management to achieve therapeutic PTT, will need bridge to long-acting AC likely NOAC when stable from acute event  3. close monitoring with tele  4. repeat TTE in am following tPA  5. check LE duplex  6. titrating FiO2 for sats>90%, if decompensates will trial with HFNC terri attempts to avoid NIPPV and intubation given his RV strain and potential for worsening RV overload with +pressure   7. follow up with Cardiology  8. possible hematology consult       Case and plan discussed with eICU attending.      Critical Care time: 45mins assessing presenting problems of acute illness that poses high probability of life threatening deterioration or end organ damage/dysfunction.  Medical decision making including Initiating plan of care, reviewing data, reviewing radiology, direct patient bedside evaluation and interpretation of vital signs, any necessary ventilator management , discussion with multidisciplinary team, discussing goals of care with patient/family, all non inclusive of procedures

## 2019-09-30 NOTE — PROVIDER CONTACT NOTE (CRITICAL VALUE NOTIFICATION) - RECOMMENDATIONS
after complete infusion of Alteplase will restart Heparin Drip, adjust rate from 1500 units/ hour to 1100 units per hour

## 2019-09-30 NOTE — PROVIDER CONTACT NOTE (CRITICAL VALUE NOTIFICATION) - ACTION/TREATMENT ORDERED:
will correct Heparin order to restart Heparin at a rate of 1100 units per hour after Alteplase is complete

## 2019-09-30 NOTE — PROVIDER CONTACT NOTE (CRITICAL VALUE NOTIFICATION) - BACKGROUND
Patient admitted with shortness of breath and weakness, found to have saddle PE, Heparin drip started in ED, INR, PTT and confirmation type and screen drawn prior to starting TPA

## 2019-09-30 NOTE — CONSULT NOTE ADULT - SUBJECTIVE AND OBJECTIVE BOX
PULMONARY CONSULT NOTE      CALE MCALLISTERN-[id number]    Patient is a 68y old  Male who presents with a chief complaint of Pulmonary Embolism (30 Sep 2019 00:25)  68M with PMHx of HTN, HLD, BPH who presented from home BIBEMS with near-syncope and severe dyspnea. Pt states he has not been feeling well for a few days. Pt seen at [facility] earlier in week with c/o of groin pain. States he was told he had an inguinal hernia which they reduced with some improvement in pain, although he admits to a cough and some SOB for past week or so. Further questioning of pt, he admits to multiple flights over past few months with most recent flight approximately 10 days ago. Yesterday with acutely worsening SOB, diaphoretic, near syncope where he almost blacked out while attempting to start his grill. Following event wife called EMS and pt was brought to ED. Upon presentation to ED found to hypoxemic and borderline hypotensive with SBP in 90's, and tachycardic in 120's.  CTA performed and revealed a large saddle PE with right heart strain and extensive b/l emboli. Pt started on heparin drip, placed on 2L N/C. ICU was consulted along with PERT team for further management.    HISTORY OF PRESENT ILLNESS:    MEDICATIONS  (STANDING):  heparin  Infusion. 1100 Unit(s)/Hr (11 mL/Hr) IV Continuous <Continuous>  heparin  Injectable 6500 Unit(s) IV Push once  influenza   Vaccine 0.5 milliLiter(s) IntraMuscular once      MEDICATIONS  (PRN):  heparin  Injectable 6500 Unit(s) IV Push every 6 hours PRN For aPTT less than 40  heparin  Injectable 3000 Unit(s) IV Push every 6 hours PRN For aPTT between 40 - 57  simethicone 80 milliGRAM(s) Chew every 8 hours PRN Gas      Allergies    No Known Allergies    Intolerances        PAST MEDICAL & SURGICAL HISTORY:  Benign prostate hyperplasia  Hyperlipidemia  Hypertension      FAMILY HISTORY:      SOCIAL HISTORY  Smoking History:     REVIEW OF SYSTEMS:    CONSTITUTIONAL:  No fevers, chills, sweats    HEENT:  Eyes:  No diplopia or blurred vision. ENT:  No earache, sore throat or runny nose.    CARDIOVASCULAR:  No pressure, squeezing, tightness, or heaviness about the chest; no palpitations.    RESPIRATORY:  see HPI    GASTROINTESTINAL:  No abdominal pain, nausea, vomiting or diarrhea.    GENITOURINARY:  No dysuria, frequency or urgency.    NEUROLOGIC:  No paresthesias, fasciculations, seizures or weakness.    PSYCHIATRIC:  No disorder of thought or mood.    Vital Signs Last 24 Hrs  T(C): 37 (30 Sep 2019 07:00), Max: 37 (30 Sep 2019 07:00)  T(F): 98.6 (30 Sep 2019 07:00), Max: 98.6 (30 Sep 2019 07:00)  HR: 86 (30 Sep 2019 08:00) (81 - 126)  BP: 111/73 (30 Sep 2019 08:00) (95/70 - 127/85)  BP(mean): 87 (30 Sep 2019 08:00) (77 - 99)  RR: 16 (30 Sep 2019 08:00) (16 - 35)  SpO2: 98% (30 Sep 2019 08:00) (93% - 99%)    PHYSICAL EXAMINATION:    GENERAL: The patient is a well-developed, well-nourished __in no apparent distress.     HEENT: Head is normocephalic and atraumatic. Extraocular muscles are intact. Mucous membranes are moist.     NECK: Supple.     LUNGS: Clear to auscultation without wheezing, rales, or rhonchi. Respirations unlabored    HEART: Regular rate and rhythm without murmur.    ABDOMEN: Soft, nontender, and nondistended.  No hepatosplenomegaly is noted.    EXTREMITIES: Without any cyanosis, clubbing, rash, lesions or edema.    NEUROLOGIC: Grossly intact.      LABS:                        15.2   12.86 )-----------( 219      ( 29 Sep 2019 19:46 )             45.7     09-29    136  |  103  |  18.0  ----------------------------<  126<H>  4.2   |  21.0<L>  |  1.06    Ca    8.9      29 Sep 2019 19:46    TPro  6.9  /  Alb  3.7  /  TBili  0.3<L>  /  DBili  x   /  AST  28  /  ALT  29  /  AlkPhos  58  09-29    PT/INR - ( 30 Sep 2019 00:44 )   PT: 12.5 sec;   INR: 1.08 ratio         PTT - ( 30 Sep 2019 00:44 )  PTT:159.6 sec      CARDIAC MARKERS ( 29 Sep 2019 19:46 )  x     / 0.08 ng/mL / x     / x     / x            Serum Pro-Brain Natriuretic Peptide: 31 pg/mL (09-29-19 @ 19:46)          MICROBIOLOGY:    RADIOLOGY & ADDITIONAL STUDIES:  CTA PULMONARY CONSULT NOTE      CALE MCALLISTERN-[id number]    Patient is a 68y old  Male who presents with a chief complaint of Pulmonary Embolism (30 Sep 2019 00:25)  68M with PMHx of HTN, HLD, BPH who presented from home BIBEMS with near-syncope and severe dyspnea. Pt states he has not been feeling well for a few days. Pt seen at [facility] earlier in week with c/o of groin pain. States he was told he had an inguinal hernia which they reduced with some improvement in pain, although he admits to a cough and some SOB for past week or so. Further questioning of pt, he admits to multiple flights over past few months with most recent flight approximately 10 days ago. Yesterday with acutely worsening SOB, diaphoretic, near syncope where he almost blacked out while attempting to start his grill. Following event wife called EMS and pt was brought to ED. Upon presentation to ED found to hypoxemic and borderline hypotensive with SBP in 90's, and tachycardic in 120's.  CTA performed and revealed a large saddle PE with right heart strain and extensive b/l emboli. Pt started on heparin drip, placed on 2L N/C. ICU was consulted along with PERT team for further management.    HISTORY OF PRESENT ILLNESS:  feels better  no fever, chill, chest pain  no bleeding hx  felt L post knee discomfort for last few days  MEDICATIONS  (STANDING):  heparin  Infusion. 1100 Unit(s)/Hr (11 mL/Hr) IV Continuous <Continuous>  heparin  Injectable 6500 Unit(s) IV Push once  influenza   Vaccine 0.5 milliLiter(s) IntraMuscular once      MEDICATIONS  (PRN):  heparin  Injectable 6500 Unit(s) IV Push every 6 hours PRN For aPTT less than 40  heparin  Injectable 3000 Unit(s) IV Push every 6 hours PRN For aPTT between 40 - 57  simethicone 80 milliGRAM(s) Chew every 8 hours PRN Gas      Allergies    No Known Allergies    Intolerances        PAST MEDICAL & SURGICAL HISTORY:  Benign prostate hyperplasia  Hyperlipidemia  Hypertension      FAMILY HISTORY:      SOCIAL HISTORY  Smoking History:     REVIEW OF SYSTEMS:    CONSTITUTIONAL:  No fevers, chills, sweats    HEENT:  Eyes:  No diplopia or blurred vision. ENT:  No earache, sore throat or runny nose.    CARDIOVASCULAR:  No pressure, squeezing, tightness, or heaviness about the chest; no palpitations.    RESPIRATORY:  see HPI    GASTROINTESTINAL:  No abdominal pain, nausea, vomiting or diarrhea.    GENITOURINARY:  No dysuria, frequency or urgency.    NEUROLOGIC:  No paresthesias, fasciculations, seizures or weakness.    PSYCHIATRIC:  No disorder of thought or mood.    Vital Signs Last 24 Hrs  T(C): 37 (30 Sep 2019 07:00), Max: 37 (30 Sep 2019 07:00)  T(F): 98.6 (30 Sep 2019 07:00), Max: 98.6 (30 Sep 2019 07:00)  HR: 86 (30 Sep 2019 08:00) (81 - 126)  BP: 111/73 (30 Sep 2019 08:00) (95/70 - 127/85)  BP(mean): 87 (30 Sep 2019 08:00) (77 - 99)  RR: 16 (30 Sep 2019 08:00) (16 - 35)  SpO2: 98% (30 Sep 2019 08:00) (93% - 99%)    PHYSICAL EXAMINATION:    GENERAL: The patient is a well-developed, well-nourished __in no apparent distress.     HEENT: Head is normocephalic and atraumatic. Extraocular muscles are intact. Mucous membranes are moist.     NECK: Supple.     LUNGS: Clear to auscultation without wheezing, rales, or rhonchi. Respirations unlabored    HEART: Regular rate and rhythm without murmur.    ABDOMEN: Soft, nontender, and nondistended.  No hepatosplenomegaly is noted.    EXTREMITIES: Without any cyanosis, clubbing, rash, lesions or edema.    NEUROLOGIC: Grossly intact.      LABS:                        15.2   12.86 )-----------( 219      ( 29 Sep 2019 19:46 )             45.7     09-29    136  |  103  |  18.0  ----------------------------<  126<H>  4.2   |  21.0<L>  |  1.06    Ca    8.9      29 Sep 2019 19:46    TPro  6.9  /  Alb  3.7  /  TBili  0.3<L>  /  DBili  x   /  AST  28  /  ALT  29  /  AlkPhos  58  09-29    PT/INR - ( 30 Sep 2019 00:44 )   PT: 12.5 sec;   INR: 1.08 ratio         PTT - ( 30 Sep 2019 00:44 )  PTT:159.6 sec      CARDIAC MARKERS ( 29 Sep 2019 19:46 )  x     / 0.08 ng/mL / x     / x     / x            Serum Pro-Brain Natriuretic Peptide: 31 pg/mL (09-29-19 @ 19:46)          MICROBIOLOGY:    RADIOLOGY & ADDITIONAL STUDIES:  CTA

## 2019-09-30 NOTE — H&P ADULT - NSHPLABSRESULTS_GEN_ALL_CORE
Medications:  alteplase    IVPB 100 milliGRAM(s) IV Intermittent once  heparin  Infusion.  Unit(s)/Hr IV Continuous <Continuous>  heparin  Injectable 6500 Unit(s) IV Push every 6 hours PRN  heparin  Injectable 3000 Unit(s) IV Push every 6 hours PRN    ICU Vital Signs Last 24 Hrs  T(C): 36.6 (29 Sep 2019 23:35), Max: 36.6 (29 Sep 2019 23:35)  T(F): 97.8 (29 Sep 2019 23:35), Max: 97.8 (29 Sep 2019 23:35)  HR: 108 (30 Sep 2019 00:00) (107 - 126)  BP: 113/82 (30 Sep 2019 00:00) (97/60 - 115/80)  BP(mean): 94 (30 Sep 2019 00:00) (94 - 94)  ABP: --  ABP(mean): --  RR: 22 (30 Sep 2019 00:00) (16 - 22)  SpO2: 98% (30 Sep 2019 00:00) (93% - 99%)    Vital Signs Last 24 Hrs  T(C): 36.6 (29 Sep 2019 23:35), Max: 36.6 (29 Sep 2019 23:35)  T(F): 97.8 (29 Sep 2019 23:35), Max: 97.8 (29 Sep 2019 23:35)  HR: 108 (30 Sep 2019 00:00) (107 - 126)  BP: 113/82 (30 Sep 2019 00:00) (97/60 - 115/80)  BP(mean): 94 (30 Sep 2019 00:00) (94 - 94)  RR: 22 (30 Sep 2019 00:00) (16 - 22)  SpO2: 98% (30 Sep 2019 00:00) (93% - 99%)    I&O's Detail    LABS:                        15.2   12.86 )-----------( 219      ( 29 Sep 2019 19:46 )             45.7     09-29    136  |  103  |  18.0  ----------------------------<  126<H>  4.2   |  21.0<L>  |  1.06    Ca    8.9      29 Sep 2019 19:46    TPro  6.9  /  Alb  3.7  /  TBili  0.3<L>  /  DBili  x   /  AST  28  /  ALT  29  /  AlkPhos  58  09-29    CARDIAC MARKERS ( 29 Sep 2019 19:46 )  x     / 0.08 ng/mL / x     / x     / x        CAPILLARY BLOOD GLUCOSE    PT/INR - ( 29 Sep 2019 19:46 )   PT: 11.8 sec;   INR: 1.03 ratio    PTT - ( 29 Sep 2019 19:46 )  PTT:24.4 sec      RADIOLOGY: < from: CT Angio Chest w/ IV Cont (09.29.19 @ 20:43) >    IMPRESSION:  Saddle pulmonaryembolism with extensive bilateral pulmonary emboli.   Right heart strain. No evidence of pulmonary infarct at this time.    < from: CT Head No Cont (09.29.19 @ 20:47) >    Impression: No acute intracranial abnormality.    Critical Care time: 35 mins assessing presenting problems of acute illness that poses high probability of life threatening deterioration or end organ damage/dysfunction.  Medical decision making including Initiating plan of care, reviewing data, reviewing radiology, direct patient bedside evaluation and interpretation of vital signs, any necessary ventilator management , discussion with multidisciplinary team, discussing goals of care with patient/family, all non inclusive of procedures LABS:                        15.2   12.86 )-----------( 219      ( 29 Sep 2019 19:46 )             45.7     09-29    136  |  103  |  18.0  ----------------------------<  126<H>  4.2   |  21.0<L>  |  1.06    Ca    8.9      29 Sep 2019 19:46    TPro  6.9  /  Alb  3.7  /  TBili  0.3<L>  /  DBili  x   /  AST  28  /  ALT  29  /  AlkPhos  58  09-29    CARDIAC MARKERS ( 29 Sep 2019 19:46 )  x     / 0.08 ng/mL / x     / x     / x        CAPILLARY BLOOD GLUCOSE    PT/INR - ( 29 Sep 2019 19:46 )   PT: 11.8 sec;   INR: 1.03 ratio    PTT - ( 29 Sep 2019 19:46 )  PTT:24.4 sec      RADIOLOGY: < from: CT Angio Chest w/ IV Cont (09.29.19 @ 20:43) >    IMPRESSION:  Saddle pulmonary embolism with extensive bilateral pulmonary emboli.   Right heart strain. No evidence of pulmonary infarct at this time.    < from: CT Head No Cont (09.29.19 @ 20:47) >    Impression: No acute intracranial abnormality.    Critical Care time: 35 mins assessing presenting problems of acute illness that poses high probability of life threatening deterioration or end organ damage/dysfunction.  Medical decision making including Initiating plan of care, reviewing data, reviewing radiology, direct patient bedside evaluation and interpretation of vital signs, any necessary ventilator management , discussion with multidisciplinary team, discussing goals of care with patient/family, all non inclusive of procedures

## 2019-09-30 NOTE — H&P ADULT - ATTENDING COMMENTS
I saw this patient at 0730 and agree with the above.    He had high risk features of a submassive pulmonary embolism and full dose intravenous alteplase was administered with good effect.    He feels much better this am; not hypoxic on room air, hemodynamics stable.    risk factor only being flight to [place] 10d ago.    Consulted Dr Finn, Dr Ballard following and will observe in the ICU until approx 24 hours after administration of alteplase.    No bleeding since administration.    Updated wife and patient at bedside.

## 2019-09-30 NOTE — PROVIDER CONTACT NOTE (EICU) - ASSESSMENT
Large PE saddle embolus with hemodynamic compromise- s/p TPA
klaudia assessment as per klaudia RN as we are evaluating via camera. Via camera right arm does appear somehwta swollen, howeer klaudia RN says not swollen comapred to elft. Patient states he has good sensation throughout arm, arm does not feel cold compared to elft, and he has good radial pulse via michelete RN

## 2019-09-30 NOTE — PROGRESS NOTE ADULT - SUBJECTIVE AND OBJECTIVE BOX
Massive PE.   s/p TPA.   Right heart strain.   Stable hemodynamics.   On heparin, convert to PO eliquis in am.   Will follow.

## 2019-09-30 NOTE — PROVIDER CONTACT NOTE (EICU) - RECOMMENDATIONS
ICU management post TPA/ IV Heparin  interventional cardiology to see pt in AM  ECHO in AM  occult malignancy and hypercoagulability w/up will be needed as per no clear evidence of obvious risk  factors for VTE ( start with PSA, stool occult blood, protein C, S, factor V [place])

## 2019-09-30 NOTE — CONSULT NOTE ADULT - PROBLEM SELECTOR PROBLEM 1
Acute saddle pulmonary embolism with acute cor pulmonale
Acute saddle pulmonary embolism with acute cor pulmonale

## 2019-09-30 NOTE — PROVIDER CONTACT NOTE (EICU) - ACTION/TREATMENT ORDERED:
-have entered AM labs for this patient including CBC, BMP, magnesium and phosphorous levels  -will CTM for results and replete as per [place] standard
Heprain 5000 IV bolus ordered for aPTT of less than 40 as per request of bedside team (Dr Elizabeth)
will give patient percocet x1 for pain. Will order venous/arterial duplex to ensure no DVT or arterial occlusion as patient was admitted with PE, psosible hypercoaguable.  -will update bedsdie provider as well, for further physcal assessment.

## 2019-10-01 DIAGNOSIS — S40.021A CONTUSION OF RIGHT UPPER ARM, INITIAL ENCOUNTER: ICD-10-CM

## 2019-10-01 LAB
ANION GAP SERPL CALC-SCNC: 12 MMOL/L — SIGNIFICANT CHANGE UP (ref 5–17)
APTT BLD: 103.3 SEC — HIGH (ref 27.5–36.3)
APTT BLD: 22.5 SEC — LOW (ref 27.5–36.3)
APTT BLD: 67.8 SEC — HIGH (ref 27.5–36.3)
APTT BLD: 73.1 SEC — HIGH (ref 27.5–36.3)
BUN SERPL-MCNC: 17 MG/DL — SIGNIFICANT CHANGE UP (ref 8–20)
CALCIUM SERPL-MCNC: 8.9 MG/DL — SIGNIFICANT CHANGE UP (ref 8.6–10.2)
CHLORIDE SERPL-SCNC: 103 MMOL/L — SIGNIFICANT CHANGE UP (ref 98–107)
CO2 SERPL-SCNC: 22 MMOL/L — SIGNIFICANT CHANGE UP (ref 22–29)
CREAT SERPL-MCNC: 1.04 MG/DL — SIGNIFICANT CHANGE UP (ref 0.5–1.3)
GLUCOSE SERPL-MCNC: 121 MG/DL — HIGH (ref 70–115)
HCT VFR BLD CALC: 40.2 % — SIGNIFICANT CHANGE UP (ref 39–50)
HCT VFR BLD CALC: 41.9 % — SIGNIFICANT CHANGE UP (ref 39–50)
HCT VFR BLD CALC: 42.5 % — SIGNIFICANT CHANGE UP (ref 39–50)
HGB BLD-MCNC: 13.3 G/DL — SIGNIFICANT CHANGE UP (ref 13–17)
HGB BLD-MCNC: 13.8 G/DL — SIGNIFICANT CHANGE UP (ref 13–17)
HGB BLD-MCNC: 14.1 G/DL — SIGNIFICANT CHANGE UP (ref 13–17)
MAGNESIUM SERPL-MCNC: 2.5 MG/DL — SIGNIFICANT CHANGE UP (ref 1.6–2.6)
MCHC RBC-ENTMCNC: 30.1 PG — SIGNIFICANT CHANGE UP (ref 27–34)
MCHC RBC-ENTMCNC: 30.4 PG — SIGNIFICANT CHANGE UP (ref 27–34)
MCHC RBC-ENTMCNC: 30.5 PG — SIGNIFICANT CHANGE UP (ref 27–34)
MCHC RBC-ENTMCNC: 32.9 GM/DL — SIGNIFICANT CHANGE UP (ref 32–36)
MCHC RBC-ENTMCNC: 33.1 GM/DL — SIGNIFICANT CHANGE UP (ref 32–36)
MCHC RBC-ENTMCNC: 33.2 GM/DL — SIGNIFICANT CHANGE UP (ref 32–36)
MCV RBC AUTO: 91.3 FL — SIGNIFICANT CHANGE UP (ref 80–100)
MCV RBC AUTO: 91.8 FL — SIGNIFICANT CHANGE UP (ref 80–100)
MCV RBC AUTO: 91.8 FL — SIGNIFICANT CHANGE UP (ref 80–100)
PHOSPHATE SERPL-MCNC: 3.2 MG/DL — SIGNIFICANT CHANGE UP (ref 2.4–4.7)
PLATELET # BLD AUTO: 222 K/UL — SIGNIFICANT CHANGE UP (ref 150–400)
PLATELET # BLD AUTO: 224 K/UL — SIGNIFICANT CHANGE UP (ref 150–400)
PLATELET # BLD AUTO: 228 K/UL — SIGNIFICANT CHANGE UP (ref 150–400)
POTASSIUM SERPL-MCNC: 4.2 MMOL/L — SIGNIFICANT CHANGE UP (ref 3.5–5.3)
POTASSIUM SERPL-SCNC: 4.2 MMOL/L — SIGNIFICANT CHANGE UP (ref 3.5–5.3)
RBC # BLD: 4.38 M/UL — SIGNIFICANT CHANGE UP (ref 4.2–5.8)
RBC # BLD: 4.59 M/UL — SIGNIFICANT CHANGE UP (ref 4.2–5.8)
RBC # BLD: 4.63 M/UL — SIGNIFICANT CHANGE UP (ref 4.2–5.8)
RBC # FLD: 13.2 % — SIGNIFICANT CHANGE UP (ref 10.3–14.5)
RBC # FLD: 13.3 % — SIGNIFICANT CHANGE UP (ref 10.3–14.5)
RBC # FLD: 13.4 % — SIGNIFICANT CHANGE UP (ref 10.3–14.5)
SODIUM SERPL-SCNC: 137 MMOL/L — SIGNIFICANT CHANGE UP (ref 135–145)
WBC # BLD: 12.13 K/UL — HIGH (ref 3.8–10.5)
WBC # BLD: 13.34 K/UL — HIGH (ref 3.8–10.5)
WBC # BLD: 14.11 K/UL — HIGH (ref 3.8–10.5)
WBC # FLD AUTO: 12.13 K/UL — HIGH (ref 3.8–10.5)
WBC # FLD AUTO: 13.34 K/UL — HIGH (ref 3.8–10.5)
WBC # FLD AUTO: 14.11 K/UL — HIGH (ref 3.8–10.5)

## 2019-10-01 PROCEDURE — 73206 CT ANGIO UPR EXTRM W/O&W/DYE: CPT | Mod: 26,RT

## 2019-10-01 PROCEDURE — 74177 CT ABD & PELVIS W/CONTRAST: CPT | Mod: 26

## 2019-10-01 PROCEDURE — 93931 UPPER EXTREMITY STUDY: CPT | Mod: 26,RT

## 2019-10-01 PROCEDURE — 99221 1ST HOSP IP/OBS SF/LOW 40: CPT

## 2019-10-01 PROCEDURE — 99232 SBSQ HOSP IP/OBS MODERATE 35: CPT

## 2019-10-01 PROCEDURE — 99233 SBSQ HOSP IP/OBS HIGH 50: CPT

## 2019-10-01 RX ORDER — HEPARIN SODIUM 5000 [USP'U]/ML
INJECTION INTRAVENOUS; SUBCUTANEOUS
Qty: 25000 | Refills: 0 | Status: DISCONTINUED | OUTPATIENT
Start: 2019-10-01 | End: 2019-10-03

## 2019-10-01 RX ORDER — HEPARIN SODIUM 5000 [USP'U]/ML
6500 INJECTION INTRAVENOUS; SUBCUTANEOUS ONCE
Refills: 0 | Status: COMPLETED | OUTPATIENT
Start: 2019-10-01 | End: 2019-10-01

## 2019-10-01 RX ORDER — HYDROMORPHONE HYDROCHLORIDE 2 MG/ML
0.5 INJECTION INTRAMUSCULAR; INTRAVENOUS; SUBCUTANEOUS ONCE
Refills: 0 | Status: DISCONTINUED | OUTPATIENT
Start: 2019-10-01 | End: 2019-10-01

## 2019-10-01 RX ORDER — DUTASTERIDE 0.5 MG/1
0.5 CAPSULE, LIQUID FILLED ORAL DAILY
Refills: 0 | Status: DISCONTINUED | OUTPATIENT
Start: 2019-10-01 | End: 2019-10-05

## 2019-10-01 RX ORDER — HEPARIN SODIUM 5000 [USP'U]/ML
6500 INJECTION INTRAVENOUS; SUBCUTANEOUS EVERY 6 HOURS
Refills: 0 | Status: DISCONTINUED | OUTPATIENT
Start: 2019-10-01 | End: 2019-10-04

## 2019-10-01 RX ORDER — FINASTERIDE 5 MG/1
5 TABLET, FILM COATED ORAL DAILY
Refills: 0 | Status: DISCONTINUED | OUTPATIENT
Start: 2019-10-01 | End: 2019-10-01

## 2019-10-01 RX ORDER — OXYCODONE AND ACETAMINOPHEN 5; 325 MG/1; MG/1
2 TABLET ORAL EVERY 4 HOURS
Refills: 0 | Status: DISCONTINUED | OUTPATIENT
Start: 2019-10-01 | End: 2019-10-05

## 2019-10-01 RX ORDER — TAMSULOSIN HYDROCHLORIDE 0.4 MG/1
0.4 CAPSULE ORAL AT BEDTIME
Refills: 0 | Status: DISCONTINUED | OUTPATIENT
Start: 2019-10-01 | End: 2019-10-05

## 2019-10-01 RX ORDER — HEPARIN SODIUM 5000 [USP'U]/ML
3000 INJECTION INTRAVENOUS; SUBCUTANEOUS EVERY 6 HOURS
Refills: 0 | Status: DISCONTINUED | OUTPATIENT
Start: 2019-10-01 | End: 2019-10-04

## 2019-10-01 RX ORDER — OXYCODONE AND ACETAMINOPHEN 5; 325 MG/1; MG/1
1 TABLET ORAL EVERY 4 HOURS
Refills: 0 | Status: DISCONTINUED | OUTPATIENT
Start: 2019-10-01 | End: 2019-10-05

## 2019-10-01 RX ORDER — FENTANYL CITRATE 50 UG/ML
50 INJECTION INTRAVENOUS ONCE
Refills: 0 | Status: DISCONTINUED | OUTPATIENT
Start: 2019-10-01 | End: 2019-10-01

## 2019-10-01 RX ADMIN — HYDROMORPHONE HYDROCHLORIDE 0.5 MILLIGRAM(S): 2 INJECTION INTRAMUSCULAR; INTRAVENOUS; SUBCUTANEOUS at 20:32

## 2019-10-01 RX ADMIN — HEPARIN SODIUM 1500 UNIT(S)/HR: 5000 INJECTION INTRAVENOUS; SUBCUTANEOUS at 11:10

## 2019-10-01 RX ADMIN — TAMSULOSIN HYDROCHLORIDE 0.4 MILLIGRAM(S): 0.4 CAPSULE ORAL at 21:14

## 2019-10-01 RX ADMIN — FENTANYL CITRATE 50 MICROGRAM(S): 50 INJECTION INTRAVENOUS at 00:12

## 2019-10-01 RX ADMIN — HYDROMORPHONE HYDROCHLORIDE 0.5 MILLIGRAM(S): 2 INJECTION INTRAMUSCULAR; INTRAVENOUS; SUBCUTANEOUS at 20:47

## 2019-10-01 RX ADMIN — DUTASTERIDE 0.5 MILLIGRAM(S): 0.5 CAPSULE, LIQUID FILLED ORAL at 21:14

## 2019-10-01 RX ADMIN — HEPARIN SODIUM 1300 UNIT(S)/HR: 5000 INJECTION INTRAVENOUS; SUBCUTANEOUS at 17:32

## 2019-10-01 RX ADMIN — HYDROMORPHONE HYDROCHLORIDE 0.5 MILLIGRAM(S): 2 INJECTION INTRAMUSCULAR; INTRAVENOUS; SUBCUTANEOUS at 05:41

## 2019-10-01 RX ADMIN — HYDROMORPHONE HYDROCHLORIDE 0.5 MILLIGRAM(S): 2 INJECTION INTRAMUSCULAR; INTRAVENOUS; SUBCUTANEOUS at 05:54

## 2019-10-01 RX ADMIN — HYDROMORPHONE HYDROCHLORIDE 0.5 MILLIGRAM(S): 2 INJECTION INTRAMUSCULAR; INTRAVENOUS; SUBCUTANEOUS at 14:30

## 2019-10-01 RX ADMIN — HEPARIN SODIUM 6500 UNIT(S): 5000 INJECTION INTRAVENOUS; SUBCUTANEOUS at 11:11

## 2019-10-01 RX ADMIN — HYDROMORPHONE HYDROCHLORIDE 0.5 MILLIGRAM(S): 2 INJECTION INTRAMUSCULAR; INTRAVENOUS; SUBCUTANEOUS at 14:15

## 2019-10-01 RX ADMIN — FENTANYL CITRATE 50 MICROGRAM(S): 50 INJECTION INTRAVENOUS at 00:25

## 2019-10-01 NOTE — PROGRESS NOTE ADULT - ATTENDING COMMENTS
68 year olld male with massive PE s/p systemic TPA administration yesterday.  Developed right axillary hematoma  CTA shows moderate sized hematoma with small focus of extravasation adjacent to axillo-brachial artery.  Systemic anticoagulation was held over the last 6 hours.  Patient denies numbness or pain in right arm.  Right axillary hematoma soft, no skin changes.    Recs:  -Obtain Stat right arm arterial duplex, if active extravasation or pseudoaneurysm is demonstrated, patient will have right arm angiogram and embolization by IR this morning. I have discussed this plan with the IR and MICU teams.  -If duplex is negative, please restart therapeutic AC as soon as possible  -Continue serial physical examinations and Q6hr CBC   -Will follow with you

## 2019-10-01 NOTE — CHART NOTE - NSCHARTNOTEFT_GEN_A_CORE
Upon Nutritional Assessment by the Registered Dietitian your patient was determined to meet criteria / has evidence of the following diagnosis/diagnoses:          [x]  Moderate acute Protein Calorie Malnutrition    Findings as based on:  •  Comprehensive nutrition assessment and consultation  •  Calorie counts (nutrient intake analysis)  •  Food acceptance and intake status from observations by staff  •  Follow up  •  Patient education  •  Intervention secondary to interdisciplinary rounds  •   concerns    Pt presents at high nutrition risk secondary to malnutrition (moderate acute) related to inadequate protein-energy intake in setting of generally not feeling well with decreased appetite/PO intake as evidenced by unintentional 6lb (3.2%) wt loss x 2 weeks, mild temple wasting, meeting <75% estimated protein-energy needs >7 days PTA.     Treatment:    The following diet has been recommended:  1) Rx MVI daily  2) Encourage HBV protein sources  3) Obtain/provide food preferences daily to inc PO  4) RD to remain available for nutrition education as needed    PROVIDER Section:     By signing this assessment you are acknowledging and agree with the diagnosis/diagnoses assigned by the Registered Dietitian    Comments:

## 2019-10-01 NOTE — PROGRESS NOTE ADULT - ATTENDING COMMENTS
I saw this patient independently at 0850 and agree with the above.    Plan as above.    ?heparin infusion vs IVC filter if unable to anticoagulate.

## 2019-10-01 NOTE — PROGRESS NOTE ADULT - PROBLEM SELECTOR PLAN 2
-monitor for now. if rebleeds will consult interventional radiology. Vascular surgery also following.

## 2019-10-01 NOTE — PROGRESS NOTE ADULT - ASSESSMENT
Submassive PE with RV strain, near syncope, unprovoked  post TPA, much improved, RV dysfunction on echo noted, suspect will improve, would repeat 48 hrs  stable hemodynamics, oxygenating well   RUE hematoma noted, vascular surgery input noted, back on Heparin, NOAC when stable  Ct abd and pelvis noted, bilateral inguinal hernias symptomatic on R, surg input prior to d/c  check PSA  hematology evaluation, repeat duplex post TPA, mobilize to chair  substitute for lisinopril given cough hx  Pulmonary follow up as out pt

## 2019-10-01 NOTE — PROGRESS NOTE ADULT - SUBJECTIVE AND OBJECTIVE BOX
PULMONARY PROGRESS NOTE      [id number]    Patient is a 68y old  Male who presents with a chief complaint of Pulmonary Embolism (01 Oct 2019 10:55)      INTERVAL HPI/OVERNIGHT EVENTS:    MEDICATIONS  (STANDING):  heparin  Infusion.  Unit(s)/Hr (15 mL/Hr) IV Continuous <Continuous>  influenza   Vaccine 0.5 milliLiter(s) IntraMuscular once      MEDICATIONS  (PRN):  ALPRAZolam 0.25 milliGRAM(s) Oral every 8 hours PRN Anxiety  heparin  Injectable 6500 Unit(s) IV Push every 6 hours PRN For aPTT less than 40  heparin  Injectable 3000 Unit(s) IV Push every 6 hours PRN For aPTT between 40 - 57  simethicone 80 milliGRAM(s) Chew every 8 hours PRN Gas      Allergies    No Known Allergies    Intolerances        PAST MEDICAL & SURGICAL HISTORY:  Benign prostate hyperplasia  Hyperlipidemia  Hypertension      SOCIAL HISTORY  Smoking History:       REVIEW OF SYSTEMS:    CONSTITUTIONAL:  No distress    HEENT:  Eyes:  No diplopia or blurred vision. ENT:  No earache, sore throat or runny nose.    CARDIOVASCULAR:  No pressure, squeezing, tightness, heaviness or aching about the chest; no palpitations.    RESPIRATORY:  No cough, shortness of breath, PND or orthopnea. Mild SOBOE    GASTROINTESTINAL:  No nausea, vomiting or diarrhea.    GENITOURINARY:  No dysuria, frequency or urgency.    NEUROLOGIC:  No paresthesias, fasciculations, seizures or weakness.    PSYCHIATRIC:  No disorder of thought or mood.    Vital Signs Last 24 Hrs  T(C): 36.8 (01 Oct 2019 09:00), Max: 37.1 (30 Sep 2019 12:00)  T(F): 98.3 (01 Oct 2019 09:00), Max: 98.8 (30 Sep 2019 12:00)  HR: 79 (01 Oct 2019 11:00) (66 - 96)  BP: 116/62 (01 Oct 2019 11:00) (97/62 - 146/85)  BP(mean): 81 (01 Oct 2019 11:00) (73 - 101)  RR: 29 (01 Oct 2019 11:00) (11 - 29)  SpO2: 92% (01 Oct 2019 11:00) (92% - 99%)    PHYSICAL EXAMINATION:    GENERAL: The patient is awake and alert in no apparent distress.     HEENT: Head is normocephalic and atraumatic. Extraocular muscles are intact. Mucous membranes are moist.    NECK: Supple.    LUNGS: Clear to auscultation without wheezing, rales or rhonchi; respirations unlabored    HEART: Regular rate and rhythm without murmur.    ABDOMEN: Soft, nontender, and nondistended.      EXTREMITIES: Without any cyanosis, clubbing, rash, lesions or edema.    NEUROLOGIC: Grossly intact.    LABS:                        13.8   12.13 )-----------( 228      ( 01 Oct 2019 08:46 )             41.9     10-01    137  |  103  |  17.0  ----------------------------<  121<H>  4.2   |  22.0  |  1.04    Ca    8.9      01 Oct 2019 03:05  Phos  3.2     10-01  Mg     2.5     10-01    TPro  6.9  /  Alb  3.7  /  TBili  0.3<L>  /  DBili  x   /  AST  28  /  ALT  29  /  AlkPhos  58  09-29    PT/INR - ( 30 Sep 2019 00:44 )   PT: 12.5 sec;   INR: 1.08 ratio         PTT - ( 01 Oct 2019 08:46 )  PTT:22.5 sec      CARDIAC MARKERS ( 29 Sep 2019 19:46 )  x     / 0.08 ng/mL / x     / x     / x            Serum Pro-Brain Natriuretic Peptide: 31 pg/mL (09-29-19 @ 19:46)          MICROBIOLOGY:    RADIOLOGY & ADDITIONAL STUDIES:

## 2019-10-01 NOTE — DIETITIAN INITIAL EVALUATION ADULT. - PERTINENT LABORATORY DATA
10-01 Na137 mmol/L Glu 121 mg/dL<H> K+ 4.2 mmol/L Cr  1.04 mg/dL BUN 17.0 mg/dL Phos 3.2 mg/dL Alb n/a   PAB n/a

## 2019-10-01 NOTE — PROGRESS NOTE ADULT - SUBJECTIVE AND OBJECTIVE BOX
Patient is a 68y old  Male who presents with a chief complaint of Pulmonary Embolism (01 Oct 2019 12:14)      BRIEF HOSPITAL COURSE: 67 y/o male with pmhx of HTN, HLD, BPH who was BIBEMS after having a near syncopal event and severe dyspnea associated with "travelling" groin pain x 3 days admitted with saddle submassive PE extending in to right lower, middle, upper lobes. Received systemic tpa for clot burden and SBP in 90s. [place]. His respiratory status has significantly improved since administration of tpa, satting 95-99% on room air with no pleuritic chest pain.     Events last 24 hours: Heparin drip stopped last night after patient developed hematoma with active extravasation that increased in size from the time of the ultrasound to the time of the CTA. Vascular surgery was consulted and initially recommended to hold the heparin drip, which it was overnight. This morning after reconsideration given the patients necessity to be anticoagulated decision was made to repeat arterial duplex. No active bleeding therefore heparin drip restarted.    Patient states his pain in the right upper arm has improved significantly. Denies shortness of breath, chest pain, nausea/vomiting.    PAST MEDICAL & SURGICAL HISTORY:  Benign prostate hyperplasia  Hyperlipidemia  Hypertension      Review of Systems:  CONSTITUTIONAL: No fever, chills, or fatigue  EYES: No eye pain, visual disturbances, or discharge  ENMT:  No difficulty hearing, tinnitus, vertigo; No sinus or throat pain  NECK: No pain or stiffness  RESPIRATORY: No cough, wheezing, chills or hemoptysis; No shortness of breath  CARDIOVASCULAR: No chest pain, palpitations, dizziness, or leg swelling  GASTROINTESTINAL: No abdominal or epigastric pain. No nausea, vomiting, or hematemesis; No diarrhea or constipation. No melena or hematochezia.  GENITOURINARY: No dysuria, frequency, hematuria, or incontinence  NEUROLOGICAL: No headaches, memory loss, loss of strength, numbness, or tremors  SKIN: No itching, burning, rashes, or lesions   MUSCULOSKELETAL: No joint pain or swelling; No muscle, back, or extremity pain  PSYCHIATRIC: No depression, anxiety, mood swings, or difficulty sleeping      Medications:        ALPRAZolam 0.25 milliGRAM(s) Oral every 8 hours PRN      heparin  Infusion.  Unit(s)/Hr IV Continuous <Continuous>  heparin  Injectable 6500 Unit(s) IV Push every 6 hours PRN  heparin  Injectable 3000 Unit(s) IV Push every 6 hours PRN    simethicone 80 milliGRAM(s) Chew every 8 hours PRN          influenza   Vaccine 0.5 milliLiter(s) IntraMuscular once              ICU Vital Signs Last 24 Hrs  T(C): 36.8 (01 Oct 2019 09:00), Max: 37.1 (30 Sep 2019 20:20)  T(F): 98.3 (01 Oct 2019 09:00), Max: 98.7 (30 Sep 2019 20:20)  HR: 80 (01 Oct 2019 12:00) (66 - 96)  BP: 119/79 (01 Oct 2019 12:00) (97/62 - 146/85)  BP(mean): 93 (01 Oct 2019 12:00) (73 - 101)  ABP: --  ABP(mean): --  RR: 22 (01 Oct 2019 12:00) (11 - 29)  SpO2: 96% (01 Oct 2019 12:00) (92% - 99%)          I&O's Detail    30 Sep 2019 07:01  -  01 Oct 2019 07:00  --------------------------------------------------------  IN:    heparin  Infusion.: 132 mL    Oral Fluid: 740 mL  Total IN: 872 mL    OUT:    Voided: 1200 mL  Total OUT: 1200 mL    Total NET: -328 mL      01 Oct 2019 07:01  -  01 Oct 2019 13:07  --------------------------------------------------------  IN:    heparin  Infusion.: 30 mL    Oral Fluid: 300 mL  Total IN: 330 mL    OUT:    Voided: 300 mL  Total OUT: 300 mL    Total NET: 30 mL            LABS:                        13.8   12.13 )-----------( 228      ( 01 Oct 2019 08:46 )             41.9     10-01    137  |  103  |  17.0  ----------------------------<  121<H>  4.2   |  22.0  |  1.04    Ca    8.9      01 Oct 2019 03:05  Phos  3.2     10-01  Mg     2.5     10-01    TPro  6.9  /  Alb  3.7  /  TBili  0.3<L>  /  DBili  x   /  AST  28  /  ALT  29  /  AlkPhos  58  09-29      CARDIAC MARKERS ( 29 Sep 2019 19:46 )  x     / 0.08 ng/mL / x     / x     / x          CAPILLARY BLOOD GLUCOSE        PT/INR - ( 30 Sep 2019 00:44 )   PT: 12.5 sec;   INR: 1.08 ratio         PTT - ( 01 Oct 2019 08:46 )  PTT:22.5 sec    CULTURES:      Physical Examination:    General: No acute distress.      HEENT: Pupils equal, reactive to light.  Symmetric.    PULM: Clear to auscultation bilaterally, no significant sputum production    NECK: Supple, no lymphadenopathy, trachea midline    CVS: Regular rate and rhythm, no murmurs, rubs, or gallops    ABD: Soft, nondistended, nontender, normoactive bowel sounds, no masses    EXT: No edema. RUE tenderness to palpation with some bruising.    SKIN: Warm and well perfused, no rashes noted.    NEURO: Alert, oriented, interactive, nonfocal    DEVICES: none    RADIOLOGY: reviewed

## 2019-10-01 NOTE — CONSULT NOTE ADULT - ATTENDING COMMENTS
68 year olld male with massive PE s/p systemic TPA administration yesterday.  Developed right axillary hematoma  CTA shows small hematoma with small focus of extravasation adjacent to axillo-brachial artery.  Systemic anticoagulation was held over the last 6 hours.  Patient denies numbness or pain in right arm.  Right axillary hematoma soft, no skin changes.    Recs:  -Obtain Stat right arm arteria duplex, if active extravasation or pseudoaneurysm is demonstrated, patient will have right arm angiogram and embolization by IR this morning. I have discussed this plan with the IR team.  -If duplex is negative, please restart therapeutic AC as soon as possible  -Continue serial physical examinations and Q6hr CBC   -Will follow with you 68 year olld male with massive PE s/p systemic TPA administration yesterday.  Developed right axillary hematoma  CTA shows moderate sized hematoma with small focus of extravasation adjacent to axillo-brachial artery.  Systemic anticoagulation was held over the last 6 hours.  Patient denies numbness or pain in right arm.  Right axillary hematoma soft, no skin changes.    Recs:  -Obtain Stat right arm arteria duplex, if active extravasation or pseudoaneurysm is demonstrated, patient will have right arm angiogram and embolization by IR this morning. I have discussed this plan with the IR team.  -If duplex is negative, please restart therapeutic AC as soon as possible  -Continue serial physical examinations and Q6hr CBC   -Will follow with you

## 2019-10-01 NOTE — CONSULT NOTE ADULT - ASSESSMENT
67 y/o male presents with dizziness and SOB, found to have extensive b/l PE with R heart strain.
69yo male w/ DVT and saddle embolus now s/p TPA infusion complicated by RUE hematoma formation. No sequelae of compartment syndrome and hematoma size appears stable at this time.    - No indication for operative intervention at this time.   - will continue to follow patient and monitor physical exam for s/s compartment  - recommend elevate RUE and keep in gentle ace wrap.   - final recs following evaluation w/ on call vascular attending, Dr. Torres
Submassive PE with RV strain, near syncope, unprovoked  post TPA, much improved  stable hemodynamics, oxygenating well on room air  No bleeding hx, up to date with routine PSA, colonoscopy  cough multifactorial, reflux, post nasal drip, lisinopril  continue full AC, NOAC soon  await echocardiogram  hematology evaluation  substitute for lisinopril  Pulmonary follow up as out pt

## 2019-10-01 NOTE — DIETITIAN INITIAL EVALUATION ADULT. - ADD RECOMMEND
Rx MVI daily Rx MVI daily; Encourage HBV protein sources; Obtain/provide food preferences daily to inc PO; RD to remain available for nutrition education as needed

## 2019-10-01 NOTE — PROGRESS NOTE ADULT - SUBJECTIVE AND OBJECTIVE BOX
PULMONARY PROGRESS NOTE      [id number]    Patient is a 68y old  Male who presents with a chief complaint of Pulmonary Embolism (01 Oct 2019 11:54)      INTERVAL HPI/OVERNIGHT EVENTS:  feels better  no cp or SOB  R arm pain improved  MEDICATIONS  (STANDING):  heparin  Infusion.  Unit(s)/Hr (15 mL/Hr) IV Continuous <Continuous>  influenza   Vaccine 0.5 milliLiter(s) IntraMuscular once      MEDICATIONS  (PRN):  ALPRAZolam 0.25 milliGRAM(s) Oral every 8 hours PRN Anxiety  heparin  Injectable 6500 Unit(s) IV Push every 6 hours PRN For aPTT less than 40  heparin  Injectable 3000 Unit(s) IV Push every 6 hours PRN For aPTT between 40 - 57  simethicone 80 milliGRAM(s) Chew every 8 hours PRN Gas      Allergies    No Known Allergies    Intolerances        PAST MEDICAL & SURGICAL HISTORY:  Benign prostate hyperplasia  Hyperlipidemia  Hypertension      SOCIAL HISTORY  Smoking History:       REVIEW OF SYSTEMS:    CONSTITUTIONAL:  No distress    HEENT:  Eyes:  No diplopia or blurred vision. ENT:  No earache, sore throat or runny nose.    CARDIOVASCULAR:  No pressure, squeezing, tightness, heaviness or aching about the chest; no palpitations.    RESPIRATORY:  see HPI    GASTROINTESTINAL:  No nausea, vomiting or diarrhea.    GENITOURINARY:  No dysuria, frequency or urgency.    NEUROLOGIC:  No paresthesias, fasciculations, seizures or weakness.    PSYCHIATRIC:  No disorder of thought or mood.    Vital Signs Last 24 Hrs  T(C): 36.8 (01 Oct 2019 09:00), Max: 37.1 (30 Sep 2019 20:20)  T(F): 98.3 (01 Oct 2019 09:00), Max: 98.7 (30 Sep 2019 20:20)  HR: 79 (01 Oct 2019 11:00) (66 - 96)  BP: 116/62 (01 Oct 2019 11:00) (97/62 - 146/85)  BP(mean): 81 (01 Oct 2019 11:00) (73 - 101)  RR: 29 (01 Oct 2019 11:00) (11 - 29)  SpO2: 92% (01 Oct 2019 11:00) (92% - 99%)    PHYSICAL EXAMINATION:    GENERAL: The patient is awake and alert in no apparent distress.     HEENT: Head is normocephalic and atraumatic. Extraocular muscles are intact. Mucous membranes are moist.    NECK: Supple.    LUNGS: Clear to auscultation without wheezing, rales or rhonchi; respirations unlabored    HEART: Regular rate and rhythm without murmur.    ABDOMEN: Soft, nontender, and nondistended.      EXTREMITIES: Without any cyanosis, has  edema. R upper arm, pulse present    NEUROLOGIC: Grossly intact.    LABS:                        13.8   12.13 )-----------( 228      ( 01 Oct 2019 08:46 )             41.9     10-01    137  |  103  |  17.0  ----------------------------<  121<H>  4.2   |  22.0  |  1.04    Ca    8.9      01 Oct 2019 03:05  Phos  3.2     10-01  Mg     2.5     10-01    TPro  6.9  /  Alb  3.7  /  TBili  0.3<L>  /  DBili  x   /  AST  28  /  ALT  29  /  AlkPhos  58  09-29    PT/INR - ( 30 Sep 2019 00:44 )   PT: 12.5 sec;   INR: 1.08 ratio         PTT - ( 01 Oct 2019 08:46 )  PTT:22.5 sec      CARDIAC MARKERS ( 29 Sep 2019 19:46 )  x     / 0.08 ng/mL / x     / x     / x            Serum Pro-Brain Natriuretic Peptide: 31 pg/mL (09-29-19 @ 19:46)          MICROBIOLOGY:    RADIOLOGY & ADDITIONAL STUDIES:

## 2019-10-01 NOTE — DIETITIAN INITIAL EVALUATION ADULT. - NS FNS WEIGHT CHANGE REASON
To PCP:  Please see below.  Do you want to see her for this?  Thank you.  Francisca Irizarry RN       unintentional

## 2019-10-01 NOTE — CONSULT NOTE ADULT - SUBJECTIVE AND OBJECTIVE BOX
Vascular surgery consult note  HPI:   68y Male w/ PMH BPH and HLD admitted on 9/30 w/ saddle PE now s/p TPA on whom vascular surgery is consulted for evaluation of RUE hematoma.  Pt initially presented s/p syncopal event on 9/29, w/ subsequent workup revealing saddle embolus. Patient recieved alteplase infusion yesterday w/ subsequent improvement in saturations and symptoms. Following completion of his TPA infusion, he was started on heparin infusion. This previous evening he developed severe RUE pain, inititally    PMH:  Benign prostate hyperplasia [Active]  Hyperlipidemia [Active]  Hypertension [Active]    ***    PSH:    ***    Home Medications:  Aspir 81 oral delayed release tablet: 1 tab(s) orally once a day  dutasteride: 0.8 milligram(s) orally once a day (at bedtime)  lisinopril 20 mg oral tablet: 1 tab(s) orally once a day  omeprazole 40 mg oral delayed release capsule: 1 cap(s) orally once a day  rosuvastatin 20 mg oral tablet: 1 tab(s) orally once a day  tamsulosin 0.4 mg oral capsule: 1 cap(s) orally once a day (at bedtime)  traZODone 50 mg oral tablet: orally once a day (at bedtime)    ***    ALL:      FMH:  Denies family history of gastrointestinal malignancy   ***    SOC Hx:   Denies etoh, tobacco, or drug use   ***    Physical Exam:   Gen: well appearing male, NAD  Neuro: AOx3, EOMI, PERRLA, no gross deficit on exam  HEENT: No oral/mucosal lesions, no neck masses or lymphadenopathy  RESP: CTABL, nonlabored breathing  CVS: RRR,   GI: abd soft, NT, ND, no rebound/guarding  Extremities: 2+ peripheral pulses Vascular surgery consult note  HPI:   68y Male w/ PMH BPH and HLD admitted on 9/30 w/ saddle PE now s/p TPA on whom vascular surgery is consulted for evaluation of RUE hematoma.  Pt initially presented s/p syncopal event on 9/29, w/ subsequent workup revealing saddle embolus. Patient received alteplase infusion yesterday w/ subsequent improvement in saturations and symptoms. Following completion of his TPA infusion, he was started on heparin gtt. This evening, pt developed severe pain in his RUE. This was initially attributed to a BP cuff but as his pain worsened it was noted that he had developed a firm swelling in his right axilla/arm. U/S and CT were performed which showed hematoma of RUE w/ small extravasation of contrast w/o localization to specific vessel. Pt states since that time his pain has begun to improve. He denies any paresthesias or numbness in his right arm. He also denies any need sticks, trauma, or line insertion attempts on the affected arm.     PMH:  Benign prostate hyperplasia [Active]  Hyperlipidemia [Active]  Hypertension [Active]    PSH:  Denies any significant prior surgeries      Home Medications:  Aspirin 81 oral delayed release tablet: 1 tab(s) orally once a day  dutasteride: 0.8 milligram(s) orally once a day (at bedtime)  lisinopril 20 mg oral tablet: 1 tab(s) orally once a day  omeprazole 40 mg oral delayed release capsule: 1 cap(s) orally once a day  rosuvastatin 20 mg oral tablet: 1 tab(s) orally once a day  tamsulosin 0.4 mg oral capsule: 1 cap(s) orally once a day (at bedtime)  TraZODone 50 mg oral tablet: orally once a day (at bedtime)      ALL: NKDA      FMH:  Denies family history of gastrointestinal malignancy       SOC Hx:   Denies ETOH, tobacco, or drug use     Physical Exam:   Gen: elderly, NAD  Neuro: AOx3, EOMI, PERRLA, no gross deficit on exam  HEENT: No oral/mucosal lesions, no neck masses or lymphadenopathy  RESP: CTABL, Mild increased work of breathing  CVS: RRR,   GI: abd soft, NT, ND, no rebound/guarding  Extremities: RUE compartments soft but w/ palpable hematoma extending from axilla through medial surface of RUE. No pulsation or active expansion, moderately tender to palpation. Right arm sensation and pulses intact w/ 2+ radial pulse and good capillary refill.

## 2019-10-01 NOTE — DIETITIAN INITIAL EVALUATION ADULT. - ETIOLOGY
related to inadequate protein-energy intake in setting of generally not feeling well with decreased appetite/PO intake

## 2019-10-01 NOTE — PROGRESS NOTE ADULT - SUBJECTIVE AND OBJECTIVE BOX
[place] [facility]                                                        Office: [address] [address]                                                       Telephone: [phone]. Fax: [phone]      CC: arm pain    INTERVAL HISTORY: improved arm pain. bleeding intra muscular.     MEDICATIONS  (STANDING):  heparin  Infusion.  Unit(s)/Hr (15 mL/Hr) IV Continuous <Continuous>  influenza   Vaccine 0.5 milliLiter(s) IntraMuscular once    ROS: All others negative     PHYSICAL EXAM:  T(C): 36.9 (10-01-19 @ 14:00), Max: 37.1 (09-30-19 @ 20:20)  HR: 81 (10-01-19 @ 17:00) (66 - 96)  BP: 130/76 (10-01-19 @ 17:00) (107/82 - 146/85)  RR: 25 (10-01-19 @ 17:00) (11 - 29)  SpO2: 96% (10-01-19 @ 17:00) (92% - 99%)  Wt(kg): --  I&O's Summary    30 Sep 2019 07:01  -  01 Oct 2019 07:00  --------------------------------------------------------  IN: 872 mL / OUT: 1200 mL / NET: -328 mL    01 Oct 2019 07:01  -  01 Oct 2019 17:45  --------------------------------------------------------  IN: 390 mL / OUT: 600 mL / NET: -210 mL      Appearance: Normal	  HEENT:   Normal oral mucosa, PERRL, EOMI	  Lymphatic: No lymphadenopathy  Cardiovascular: Normal S1 S2, No JVD, No murmurs, No edema  Respiratory: Lungs clear to auscultation	  Psychiatry: A & O x 3, Mood & affect appropriate  Gastrointestinal:  Soft, Non-tender, + BS	  Skin: No rashes, No ecchymoses, No cyanosis  Neurologic: Non-focal  Extremities: right arm hematoma.   Vascular: Peripheral pulses palpable 2+ bilaterally    TELEMETRY: 	      LABS:	 	                        13.3   14.11 )-----------( 222      ( 01 Oct 2019 16:56 )             40.2     10-01    137  |  103  |  17.0  ----------------------------<  121<H>  4.2   |  22.0  |  1.04    Ca    8.9      01 Oct 2019 03:05  Phos  3.2     10-01  Mg     2.5     10-01    TPro  6.9  /  Alb  3.7  /  TBili  0.3<L>  /  DBili  x   /  AST  28  /  ALT  29  /  AlkPhos  58  09-29

## 2019-10-01 NOTE — PROGRESS NOTE ADULT - PROBLEM SELECTOR PLAN 1
complicated by bleeding event in the arm. Now stable. On heparin drip.   Continue.   Monitor for atleast 24-48 hours on heparin drip. If stable, start po anticoagulants on thursday am.
restarted on heparin drip. titrate to goal ptt 58-99. will need to be bridged to oral anticoagulants NOAC with outpatient f/u but will keep on heparin for now for short acting in setting of hematoma. Will need a/c for at least 6 months for provoked PE.  -incentive spirometry, at risk for pulmonary infarct due to clot burden.  -TTE performed, repeat as outpatient to monitor RV systolic function.  -pulm following

## 2019-10-01 NOTE — PROGRESS NOTE ADULT - SUBJECTIVE AND OBJECTIVE BOX
Vascular Surgery follow up    Heparin ggt restarted    Patient reports no acute changes to right upper extremity, no acute motor or sensory deficits. No worsening of pain    Ace wrap remain in place    Upper bicep region swelling unchanged    palpable radial and brachial pulses    good right hand      - will continue to follow closely

## 2019-10-01 NOTE — DIETITIAN INITIAL EVALUATION ADULT. - OTHER INFO
68M with PMHx of HTN, HLD, BPH who presented from home BIBEMS with near-syncope and severe dyspnea. Upon presentation to ED found to hypoxemic and borderline hypotensive with SBP in 90's, and tachycardic in 120's.  CTA performed and revealed a large saddle PE with right heart strain and extensive b/l emboli now s/p TPA, development of RUE hematoma. Pt reports generally not feeling well x few days PTA, c/o cough per chart due to ACE inhibitor.  Pt with good PO intake completing % of meals in house. 68M with PMHx of HTN, HLD, BPH who presented from home BIBEMS with near-syncope and severe dyspnea. Upon presentation to ED found to hypoxemic and borderline hypotensive with SBP in 90's, and tachycardic in 120's.  CTA performed and revealed a large saddle PE with right heart strain and extensive b/l emboli now s/p TPA, development of RUE hematoma. Pt reports generally not feeling well x few days PTA, c/o cough per chart due to ACE inhibitor.  Pt reports having a poor appetite x ~2 weeks due to feeling sick prior to pulmonary embolism. Pt states UBW ~190lbs shows ~6lb weight loss x 2 weeks. In house Pt states PO intake is improving though continues with poor appetite. Pt mentions prioritizing protein, limiting excess sugar and ensuring adequate hydration. Pt declines nutrition education 68M with PMHx of HTN, HLD, BPH who presented from home BIBEMS with near-syncope and severe dyspnea. Upon presentation to ED found to hypoxemic and borderline hypotensive with SBP in 90's, and tachycardic in 120's.  CTA performed and revealed a large saddle PE with right heart strain and extensive b/l emboli now s/p TPA, development of RUE hematoma. Pt reports generally not feeling well x few days PTA, c/o cough per chart due to ACE inhibitor.  Pt reports having a poor appetite x ~2 weeks due to feeling sick prior to pulmonary embolism. Pt states UBW ~190lbs shows ~6lb weight loss x 2 weeks. In house Pt states PO intake is improving though continues with poor appetite. Pt mentions prioritizing protein, limiting excess sugar and ensuring adequate hydration. Pt declines nutrition education, dismissing RD when answering the phone.

## 2019-10-01 NOTE — PROGRESS NOTE ADULT - SUBJECTIVE AND OBJECTIVE BOX
Vascular Surgery follow up      Patient seen and examined with Dr. Torres at bedside    Recent PE, Left popliteal DVT, treated with systemic tPA    s/p tPA, patient developed Right upper extremity hematoma with proximal brachial arterial extravasation on the CTA     Hematoma to the right medial/proximal brachial region, compartments are not tense    palpable brachial and radial     No gross motor or sensory deficits     - will consult IR for possible coiling if hemorrhage remains persistent   - In the setting of recent significant PE and dvt, suggest restarting anticoagulation   - keep ace wrap applied to right upper ext  - serial neurovascular checks

## 2019-10-01 NOTE — PROGRESS NOTE ADULT - ASSESSMENT
67 y/o male with pmhx of HTN, HLD, BPH who was BIBEMS after having a near syncopal event and severe dyspnea associated with "travelling" groin pain x 3 days admitted with saddle submassive PE extending in to right lower, middle, upper lobes. Received systemic tpa for clot burden and SBP in 90s. [place] His respiratory status has significantly improved since administration of tpa, satting 95-99% on room air with no pleuritic chest pain.     hospital course now complicated by RUE intramuscular hematoma.

## 2019-10-02 DIAGNOSIS — I10 ESSENTIAL (PRIMARY) HYPERTENSION: ICD-10-CM

## 2019-10-02 LAB
ANION GAP SERPL CALC-SCNC: 13 MMOL/L — SIGNIFICANT CHANGE UP (ref 5–17)
APTT BLD: 69 SEC — HIGH (ref 27.5–36.3)
BUN SERPL-MCNC: 14 MG/DL — SIGNIFICANT CHANGE UP (ref 8–20)
CALCIUM SERPL-MCNC: 8.8 MG/DL — SIGNIFICANT CHANGE UP (ref 8.6–10.2)
CHLORIDE SERPL-SCNC: 98 MMOL/L — SIGNIFICANT CHANGE UP (ref 98–107)
CO2 SERPL-SCNC: 24 MMOL/L — SIGNIFICANT CHANGE UP (ref 22–29)
CREAT SERPL-MCNC: 0.99 MG/DL — SIGNIFICANT CHANGE UP (ref 0.5–1.3)
GLUCOSE SERPL-MCNC: 110 MG/DL — SIGNIFICANT CHANGE UP (ref 70–115)
HCT VFR BLD CALC: 39.7 % — SIGNIFICANT CHANGE UP (ref 39–50)
HGB BLD-MCNC: 13 G/DL — SIGNIFICANT CHANGE UP (ref 13–17)
MAGNESIUM SERPL-MCNC: 2.2 MG/DL — SIGNIFICANT CHANGE UP (ref 1.6–2.6)
MCHC RBC-ENTMCNC: 30 PG — SIGNIFICANT CHANGE UP (ref 27–34)
MCHC RBC-ENTMCNC: 32.7 GM/DL — SIGNIFICANT CHANGE UP (ref 32–36)
MCV RBC AUTO: 91.7 FL — SIGNIFICANT CHANGE UP (ref 80–100)
PHOSPHATE SERPL-MCNC: 4 MG/DL — SIGNIFICANT CHANGE UP (ref 2.4–4.7)
PLATELET # BLD AUTO: 224 K/UL — SIGNIFICANT CHANGE UP (ref 150–400)
POTASSIUM SERPL-MCNC: 4.4 MMOL/L — SIGNIFICANT CHANGE UP (ref 3.5–5.3)
POTASSIUM SERPL-SCNC: 4.4 MMOL/L — SIGNIFICANT CHANGE UP (ref 3.5–5.3)
RBC # BLD: 4.33 M/UL — SIGNIFICANT CHANGE UP (ref 4.2–5.8)
RBC # FLD: 12.9 % — SIGNIFICANT CHANGE UP (ref 10.3–14.5)
SODIUM SERPL-SCNC: 135 MMOL/L — SIGNIFICANT CHANGE UP (ref 135–145)
TROPONIN T SERPL-MCNC: 0.04 NG/ML — SIGNIFICANT CHANGE UP (ref 0–0.06)
WBC # BLD: 13.94 K/UL — HIGH (ref 3.8–10.5)
WBC # FLD AUTO: 13.94 K/UL — HIGH (ref 3.8–10.5)

## 2019-10-02 PROCEDURE — 99291 CRITICAL CARE FIRST HOUR: CPT

## 2019-10-02 PROCEDURE — 93010 ELECTROCARDIOGRAM REPORT: CPT

## 2019-10-02 PROCEDURE — 99223 1ST HOSP IP/OBS HIGH 75: CPT

## 2019-10-02 RX ORDER — PANTOPRAZOLE SODIUM 20 MG/1
40 TABLET, DELAYED RELEASE ORAL
Refills: 0 | Status: DISCONTINUED | OUTPATIENT
Start: 2019-10-02 | End: 2019-10-05

## 2019-10-02 RX ORDER — SODIUM CHLORIDE 9 MG/ML
500 INJECTION, SOLUTION INTRAVENOUS ONCE
Refills: 0 | Status: COMPLETED | OUTPATIENT
Start: 2019-10-02 | End: 2019-10-02

## 2019-10-02 RX ORDER — MAGNESIUM SULFATE 500 MG/ML
4 VIAL (ML) INJECTION ONCE
Refills: 0 | Status: DISCONTINUED | OUTPATIENT
Start: 2019-10-02 | End: 2019-10-02

## 2019-10-02 RX ORDER — POTASSIUM PHOSPHATE, MONOBASIC POTASSIUM PHOSPHATE, DIBASIC 236; 224 MG/ML; MG/ML
30 INJECTION, SOLUTION INTRAVENOUS ONCE
Refills: 0 | Status: DISCONTINUED | OUTPATIENT
Start: 2019-10-02 | End: 2019-10-02

## 2019-10-02 RX ADMIN — OXYCODONE AND ACETAMINOPHEN 2 TABLET(S): 5; 325 TABLET ORAL at 11:24

## 2019-10-02 RX ADMIN — OXYCODONE AND ACETAMINOPHEN 2 TABLET(S): 5; 325 TABLET ORAL at 15:59

## 2019-10-02 RX ADMIN — OXYCODONE AND ACETAMINOPHEN 2 TABLET(S): 5; 325 TABLET ORAL at 17:05

## 2019-10-02 RX ADMIN — DUTASTERIDE 0.5 MILLIGRAM(S): 0.5 CAPSULE, LIQUID FILLED ORAL at 21:11

## 2019-10-02 RX ADMIN — SODIUM CHLORIDE 1000 MILLILITER(S): 9 INJECTION, SOLUTION INTRAVENOUS at 05:50

## 2019-10-02 RX ADMIN — OXYCODONE AND ACETAMINOPHEN 2 TABLET(S): 5; 325 TABLET ORAL at 21:42

## 2019-10-02 RX ADMIN — OXYCODONE AND ACETAMINOPHEN 2 TABLET(S): 5; 325 TABLET ORAL at 02:02

## 2019-10-02 RX ADMIN — OXYCODONE AND ACETAMINOPHEN 2 TABLET(S): 5; 325 TABLET ORAL at 06:57

## 2019-10-02 RX ADMIN — TAMSULOSIN HYDROCHLORIDE 0.4 MILLIGRAM(S): 0.4 CAPSULE ORAL at 21:46

## 2019-10-02 RX ADMIN — OXYCODONE AND ACETAMINOPHEN 2 TABLET(S): 5; 325 TABLET ORAL at 12:20

## 2019-10-02 RX ADMIN — OXYCODONE AND ACETAMINOPHEN 2 TABLET(S): 5; 325 TABLET ORAL at 05:57

## 2019-10-02 RX ADMIN — OXYCODONE AND ACETAMINOPHEN 2 TABLET(S): 5; 325 TABLET ORAL at 01:02

## 2019-10-02 RX ADMIN — OXYCODONE AND ACETAMINOPHEN 2 TABLET(S): 5; 325 TABLET ORAL at 21:12

## 2019-10-02 NOTE — CHART NOTE - NSCHARTNOTEFT_GEN_A_CORE
Patient is admitted with acute saddle PE with acute cor pulmonale, cardiogenic shock, s/p full dose TPA complicated by spontaneous RUE hematoma.    Called to bedside emergently by RN     Patient was seated at edge of bed ~5 mins, stood up for approximately 3 minutes, and suddenly became dizzy, diaphoretic, clammy, with visual floaters. Denies chest pain, shortness of breath.    Became bradycardic and hypotensive 67/43, given 500cc bolus balanced crystalloid    Symptoms improved with resuscitation, 's and HR NSR 85 bpm    Suspect vasovagal syncope. Ordered for EKG, lytes, CE, and repeat TTE to eval RV function s/p TPA. Continue heparin gtt. Bedrest for now    Still with severe RUE pain, relieved with narcotics, exam stable neurovascularly in tact    CC: 30 minutes

## 2019-10-02 NOTE — PROGRESS NOTE ADULT - ASSESSMENT
67 y/o male with pmhx of HTN, HLD, BPH who was BIBEMS after having a near syncopal event and severe dyspnea associated with "travelling" groin pain x 3 days admitted with saddle submassive PE extending in to right lower, middle, upper lobes      1. Saddle PE with cor pulmonale   2. R arm hematoma         1. Saddle PE with cor pulmonale   - s/p TPA (complication of R underarm hematoma)  - will remain on heparin drip until converted to PO most likely on Thursday   - follow heparin nomogram   - Ct and echo shows reduced RV systolic function   - HD stable pot TPA   - can bridge to oral AC on Thursday per cardio     2. R arm hematoma   - now stable   - added Dilaudid prn for break through pain and oxycodone for mild to moderate   - If reoccurs would need to call IR for assistance   - appreciate vascular surgery note   - transfuse for Hg <7    Restarted home meds.    37 minutes assessing presenting problems of acute illness, which pose high probability of life threatening deterioration or end organ damage/dysfunction, non-inclusive of procedures performed, discussing goals of care with patient/family 67 y/o male with pmhx of HTN, HLD, BPH who was BIBEMS after having a near syncopal event and severe dyspnea associated with "travelling" groin pain x 3 days admitted with saddle submassive PE extending in to right lower, middle, upper lobes      1. Saddle PE with cor pulmonale   2. R arm hematoma   3. BPH      1. Acute Saddle PE with acute cor pulmonale   - CT and echo shows severely reduced RV systolic function   - Became hypotensive s/p full dose TPA (complication of R underarm hematoma)  - Will remain on heparin drip and bridge to PO most likely on Thursday   - Follow heparin nomogram   - Oxygenation improved, on nasal cannula, no longer tachycardic, HD stable      2. R arm hematoma   - Serial neurovascular exams  - Repeat duplex stable  - Still having intermittent severe pain, transition to oxycodone  - If reoccurs would need to call IR for assistance   - appreciate vascular surgery note   - Trending H/H, transfuse as needed for Hg <7    3. BPH  - Restarted Flomax and Dutasteride    37 minutes assessing presenting problems of acute illness, which pose high probability of life threatening deterioration or end organ damage/dysfunction, non-inclusive of procedures performed, discussing goals of care with patient/family

## 2019-10-02 NOTE — PROGRESS NOTE ADULT - SUBJECTIVE AND OBJECTIVE BOX
Patient is a 68y old  Male who presents with a chief complaint of Pulmonary Embolism (02 Oct 2019 08:19)    BRIEF HOSPITAL COURSE:   67 y/o male with pmhx of HTN, HLD, BPH who was BIBEMS after having a near syncopal event and severe dyspnea associated with "travelling" groin pain x 3 days admitted with saddle submassive PE extending in to right lower, middle, upper lobes. Received systemic tpa for clot burden and SBP in 90s. [place]. His respiratory status has significantly improved since administration of tpa, satting 95-99% on room air with no pleuritic chest pain.     Events last 24 hours:   Had a spell of hypotension when he was standing and cleaning himself this morning. Continues to c/o mild pain in RUE. Denies any CP/SOB or fever. Feels anxious    PAST MEDICAL & SURGICAL HISTORY:  Benign prostate hyperplasia  Hyperlipidemia  Hypertension    Review of Systems:  CONSTITUTIONAL: No fever, chills, or fatigue  EYES: No eye pain, visual disturbances, or discharge  ENMT:  No difficulty hearing, tinnitus, vertigo; No sinus or throat pain  NECK: No pain or stiffness  RESPIRATORY: No cough, wheezing, chills or hemoptysis; No shortness of breath  CARDIOVASCULAR: No chest pain, palpitations, dizziness, or leg swelling  GASTROINTESTINAL: No abdominal or epigastric pain. No nausea, vomiting, or hematemesis; No diarrhea or constipation. No melena or hematochezia.  GENITOURINARY: No dysuria, frequency, hematuria, or incontinence  NEUROLOGICAL: No headaches, memory loss, loss of strength, numbness, or tremors  SKIN: No itching, burning, rashes, or lesions   MUSCULOSKELETAL: No joint pain or swelling; No muscle, back, or extremity pain  PSYCHIATRIC: No depression, anxiety, mood swings, or difficulty sleeping    Physical Examination:    ICU Vital Signs Last 24 Hrs  T(C): 36.3 (02 Oct 2019 10:15), Max: 37.1 (01 Oct 2019 21:00)  T(F): 97.4 (02 Oct 2019 10:15), Max: 98.8 (01 Oct 2019 21:00)  HR: 83 (02 Oct 2019 11:00) (58 - 94)  BP: 129/69 (02 Oct 2019 11:00) (67/42 - 147/73)  BP(mean): 89 (02 Oct 2019 11:00) (48 - 103)  ABP: --  ABP(mean): --  RR: 25 (02 Oct 2019 11:00) (15 - 33)  SpO2: 97% (02 Oct 2019 11:00) (91% - 99%)      General: No acute distress.      Neuro: AAO*3, No motor, sensory, or cranial nerve deficit    HEENT: Pupils equal, reactive to light, Moist oral mucosa    PULM: Clear to auscultation bilaterally, no significant adventitious breath sounds     CVS: Regular rhythm and controlled rate, no murmurs, rubs, or gallops    ABD: Soft, nondistended, nontender, normoactive bowel sounds, no CVA tenderness    EXT: No b/l LE edema, nontender with pedal pulse palpable     SKIN: Warm and well perfused, no acute rashes       Medications:    tamsulosin 0.4 milliGRAM(s) Oral at bedtime      ALPRAZolam 0.25 milliGRAM(s) Oral every 8 hours PRN  oxyCODONE    5 mG/acetaminophen 325 mG 1 Tablet(s) Oral every 4 hours PRN  oxyCODONE    5 mG/acetaminophen 325 mG 2 Tablet(s) Oral every 4 hours PRN      heparin  Infusion.  Unit(s)/Hr IV Continuous <Continuous>  heparin  Injectable 6500 Unit(s) IV Push every 6 hours PRN  heparin  Injectable 3000 Unit(s) IV Push every 6 hours PRN    simethicone 80 milliGRAM(s) Chew every 8 hours PRN      dutasteride 0.5 milliGRAM(s) Oral daily      influenza   Vaccine 0.5 milliLiter(s) IntraMuscular once              I&O's Detail    01 Oct 2019 07:01  -  02 Oct 2019 07:00  --------------------------------------------------------  IN:    heparin  Infusion.: 287 mL    Lactated Ringers IV Bolus: 500 mL    Oral Fluid: 500 mL  Total IN: 1287 mL    OUT:    Voided: 4500 mL  Total OUT: 4500 mL    Total NET: -3213 mL      02 Oct 2019 07:01  -  02 Oct 2019 11:32  --------------------------------------------------------  IN:    heparin  Infusion.: 52 mL    Oral Fluid: 300 mL  Total IN: 352 mL    OUT:    Voided: 925 mL  Total OUT: 925 mL    Total NET: -573 mL            RADIOLOGY/ Microbiology/ Labs: reviewed

## 2019-10-02 NOTE — PROGRESS NOTE ADULT - ATTENDING COMMENTS
Of note patient became very agitated upon my arrival to examine him. Patient was upset that he was being transferred out of ICU in middle of the night. He states that the "noise levels in the hallways are too loud" and that he is getting woken up too frequently.  I explained to the patient that nursing staff will be coming in and out of his room to adjust his heparin drip if necessary and assess throughout the night as well as monitoring vitals. Furthermore, explained to the patient that he no longer requires critical care services and plan is for transfer to a telemetry floor where his blood pressure, cardiac rhythm, and vital signs will continue to be monitored. Patient repeatedly states that staff is incompetent and I assured him that the staff are all trained appropriately and the critical care nurses are trained to provide critical care services while in the ICU. All questions answered. Patient will remain in ICU tonight under the Hospitalist service. Plan of care discussed with ICU IMAN KEYS as well as patient and his RN at bedside.

## 2019-10-02 NOTE — PROGRESS NOTE ADULT - SUBJECTIVE AND OBJECTIVE BOX
Vascular Surgery Follow up    Remains on Hep ggt    reported orthostatic hypotension this Am, resolved with fluids    Edema to the upper right arm is impoved    right arm pain is less    2+ radial and brachial    No acute motor or sensory deficits     - Continue anticoagulation if no contraindications  - No acute vascular surgical intervention waranted  - Vascular surgery to sign off, recall if needed (V3) inappropriate words

## 2019-10-02 NOTE — CHART NOTE - NSCHARTNOTEFT_GEN_A_CORE
69 y/o male with pmhx of HTN, HLD, BPH who was BIBEMS after having a near syncopal event and severe dyspnea associated with "travelling" groin pain x 3 days admitted with saddle submassive PE extending in to right lower, middle, upper lobes. Received systemic tpa for clot burden and SBP in 90s      Course complicated by hematoma with active extravasation in R axilla   ultrasound repeated which showed stable clot  remains 4/10 in pain, which is being treated with Dilaudid   ultrasound repeated which showed stable clot  on heparin infusion will be bridged to PO tomorrow   early this morning he had an orthostatic hypotension episode, which resolved after fluid bolus   today no issues, stable for transfer to Telemetry

## 2019-10-02 NOTE — PROGRESS NOTE ADULT - SUBJECTIVE AND OBJECTIVE BOX
Patient is a 68y old  Male who presents with a chief complaint of Pulmonary Embolism (01 Oct 2019 17:45)      BRIEF HOSPITAL COURSE:   67 y/o male with pmhx of HTN, HLD, BPH who was BIBEMS after having a near syncopal event and severe dyspnea associated with "travelling" groin pain x 3 days admitted with saddle submassive PE extending in to right lower, middle, upper lobes. Received systemic tpa for clot burden and SBP in 90s. [place]. His respiratory status has significantly improved since administration of tpa, satting 95-99% on room air with no pleuritic chest pain.      Events last 24 hours:   patient developed hematoma with active extravasation that increased in size from the time of the ultrasound to the time of the CTA.   ultrasound repeated which showed stable clot  Heparin resumed   Pain 7/10 being treated with Dilaudid     PAST MEDICAL & SURGICAL HISTORY:  Benign prostate hyperplasia  Hyperlipidemia  Hypertension              Review of Systems:  CONSTITUTIONAL: No fever, chills, or fatigue  EYES: No visual disturbances  ENMT:  No difficulty hearing  NECK: No pain  RESPIRATORY: No cough. No shortness of breath  CARDIOVASCULAR: No chest pain, palpitations, or leg swelling  GASTROINTESTINAL: No abdominal pain. No nausea, vomiting, diarrhea, or constipation. No hematemesis, melena or hematochezia  GENITOURINARY: No dysuria, frequency, hematuria, or incontinence  NEUROLOGICAL: No headaches, loss of strength, numbness, or tremors  SKIN: No rashes  MUSCULOSKELETAL: No back or extremity pain. No calf pain  PSYCHIATRIC: No depression, anxiety, or difficulty sleeping        Medications:  tamsulosin 0.4 milliGRAM(s) Oral at bedtime  ALPRAZolam 0.25 milliGRAM(s) Oral every 8 hours PRN  oxyCODONE    5 mG/acetaminophen 325 mG 1 Tablet(s) Oral every 4 hours PRN  oxyCODONE    5 mG/acetaminophen 325 mG 2 Tablet(s) Oral every 4 hours PRN  heparin  Infusion.  Unit(s)/Hr IV Continuous <Continuous>  heparin  Injectable 6500 Unit(s) IV Push every 6 hours PRN  heparin  Injectable 3000 Unit(s) IV Push every 6 hours PRN  simethicone 80 milliGRAM(s) Chew every 8 hours PRN  dutasteride 0.5 milliGRAM(s) Oral daily  influenza   Vaccine 0.5 milliLiter(s) IntraMuscular once        ICU Vital Signs Last 24 Hrs  T(C): 37.1 (01 Oct 2019 21:00), Max: 37.1 (01 Oct 2019 21:00)  T(F): 98.8 (01 Oct 2019 21:00), Max: 98.8 (01 Oct 2019 21:00)  HR: 90 (02 Oct 2019 01:00) (66 - 93)  BP: 126/75 (02 Oct 2019 01:00) (107/82 - 139/74)  BP(mean): 95 (02 Oct 2019 01:00) (81 - 101)  RR: 21 (02 Oct 2019 01:00) (18 - 29)  SpO2: 96% (02 Oct 2019 01:00) (91% - 99%)          I&O's Detail    30 Sep 2019 07:01  -  01 Oct 2019 07:00  --------------------------------------------------------  IN:    heparin  Infusion.: 132 mL    Oral Fluid: 740 mL  Total IN: 872 mL    OUT:    Voided: 1200 mL  Total OUT: 1200 mL    Total NET: -328 mL      01 Oct 2019 07:01  -  02 Oct 2019 03:36  --------------------------------------------------------  IN:    heparin  Infusion.: 183 mL    Oral Fluid: 500 mL  Total IN: 683 mL    OUT:    Voided: 3600 mL  Total OUT: 3600 mL    Total NET: -2917 mL            LABS:                        13.3   14.11 )-----------( 222      ( 01 Oct 2019 16:56 )             40.2     10-01    137  |  103  |  17.0  ----------------------------<  121<H>  4.2   |  22.0  |  1.04    Ca    8.9      01 Oct 2019 03:05  Phos  3.2     10-01  Mg     2.5     10-01            CAPILLARY BLOOD GLUCOSE        PTT - ( 01 Oct 2019 23:42 )  PTT:73.1 sec    CULTURES:        Physical Examination:    General: No acute distress.      HEENT: Pupils equal, reactive to light.  Symmetric.    PULM: Clear to auscultation bilaterally, no significant sputum production    CVS: Regular rate and rhythm, no murmurs, rubs, or gallops    ABD: Soft, nondistended, nontender, normoactive bowel sounds, no masses    EXT: Right UE hematoma noted near axilla, tender to palpation, does appear to be stable    SKIN: Warm and well perfused, no rashes noted.    NEURO: Alert, oriented, interactive, nonfocal        RADIOLOGY:   < from: CT Angio Upper Extremity w/ IV Cont, Right (10.01.19 @ 01:36) >  TECHNIQUE:   Imaging protocol: Axial CTA of the Right upper extremity with intravenous   contrast material, including non-contrast images if performed.   3D rendering: MIP reconstructed images were created and reviewed.     COMPARISON:   CT ANGIO CHEST WITHOUT AND OR WITH IV CONTRAST 9/29/2019 8:24 PM     FINDINGS:   Right subclavian artery: No acute findings. No occlusion or significant   stenosis.   Right axillary artery: No acute findings. No occlusion or significant   stenosis.   Right brachial artery: No acute findings. No occlusion or significant   stenosis.   Right radial artery: No acute findings. No occlusion or significant   stenosis.   Right ulnar artery: No acute findings. No occlusion or significant   stenosis.     Bones/joints: No fracture or dislocation.   Soft tissues: Large intraparenchymal hematoma in the medial head of   biceps   muscle measuring approximately 6.4 x 6.5 cm in axial dimension and 11 cm   in   craniocaudal dimension. Tiny foci of active extravasation of contrast   seen on   the arterial phase images with significant extravasated contrast on the   delayed   venous phase images. No evidence of vascular laceration or injury.     IMPRESSION:   Large intramuscular hematoma in the medial proximal biceps muscle   measuring 6.4   x 6.5 x 11 cm with significant active excretion of contrast seen on the   study.        INVASIVE LINES:no  INDWELLING CALVIN:no  VTE PROPHYLAXIS: heparin   CAM ICU: no  CODE STATUS: Full Patient is a 68y old  Male who presents with a chief complaint of Pulmonary Embolism (01 Oct 2019 17:45)      BRIEF HOSPITAL COURSE:   69 y/o male with pmhx of HTN, HLD, BPH who was BIBEMS after having a near syncopal event and severe dyspnea associated with "travelling" groin pain x 3 days admitted with saddle submassive PE extending in to right lower, middle, upper lobes. Received systemic tpa for clot burden and SBP in 90s. [place]. His respiratory status has significantly improved since administration of tpa, satting 95-99% on room air with no pleuritic chest pain.      Events last 24 hours:   patient developed hematoma with active extravasation that increased in size from the time of the ultrasound to the time of the CTA.   ultrasound repeated which showed stable clot  Heparin resumed   Pain 7/10 being treated with Dilaudid       PAST MEDICAL & SURGICAL HISTORY:  Benign prostate hyperplasia  Hyperlipidemia  Hypertension      Review of Systems:  CONSTITUTIONAL: No fever, chills, or fatigue  EYES: No visual disturbances  ENMT:  No difficulty hearing  NECK: No pain  RESPIRATORY: No cough. No shortness of breath  CARDIOVASCULAR: No chest pain, palpitations, or leg swelling  GASTROINTESTINAL: No abdominal pain. No nausea, vomiting, diarrhea, or constipation. No hematemesis, melena or hematochezia  GENITOURINARY: No dysuria, frequency, hematuria, or incontinence  NEUROLOGICAL: No headaches, loss of strength, numbness, or tremors  SKIN: No rashes  MUSCULOSKELETAL: No back or extremity pain. No calf pain  PSYCHIATRIC: No depression, anxiety, or difficulty sleeping        Medications:  tamsulosin 0.4 milliGRAM(s) Oral at bedtime  ALPRAZolam 0.25 milliGRAM(s) Oral every 8 hours PRN  oxyCODONE    5 mG/acetaminophen 325 mG 1 Tablet(s) Oral every 4 hours PRN  oxyCODONE    5 mG/acetaminophen 325 mG 2 Tablet(s) Oral every 4 hours PRN  heparin  Infusion.  Unit(s)/Hr IV Continuous <Continuous>  heparin  Injectable 6500 Unit(s) IV Push every 6 hours PRN  heparin  Injectable 3000 Unit(s) IV Push every 6 hours PRN  simethicone 80 milliGRAM(s) Chew every 8 hours PRN  dutasteride 0.5 milliGRAM(s) Oral daily  influenza   Vaccine 0.5 milliLiter(s) IntraMuscular once        ICU Vital Signs Last 24 Hrs  T(C): 37.1 (01 Oct 2019 21:00), Max: 37.1 (01 Oct 2019 21:00)  T(F): 98.8 (01 Oct 2019 21:00), Max: 98.8 (01 Oct 2019 21:00)  HR: 90 (02 Oct 2019 01:00) (66 - 93)  BP: 126/75 (02 Oct 2019 01:00) (107/82 - 139/74)  BP(mean): 95 (02 Oct 2019 01:00) (81 - 101)  RR: 21 (02 Oct 2019 01:00) (18 - 29)  SpO2: 96% (02 Oct 2019 01:00) (91% - 99%)          I&O's Detail    30 Sep 2019 07:01  -  01 Oct 2019 07:00  --------------------------------------------------------  IN:    heparin  Infusion.: 132 mL    Oral Fluid: 740 mL  Total IN: 872 mL    OUT:    Voided: 1200 mL  Total OUT: 1200 mL    Total NET: -328 mL      01 Oct 2019 07:01  -  02 Oct 2019 03:36  --------------------------------------------------------  IN:    heparin  Infusion.: 183 mL    Oral Fluid: 500 mL  Total IN: 683 mL    OUT:    Voided: 3600 mL  Total OUT: 3600 mL    Total NET: -2917 mL            LABS:                        13.3   14.11 )-----------( 222      ( 01 Oct 2019 16:56 )             40.2     10-01    137  |  103  |  17.0  ----------------------------<  121<H>  4.2   |  22.0  |  1.04    Ca    8.9      01 Oct 2019 03:05  Phos  3.2     10-01  Mg     2.5     10-01            CAPILLARY BLOOD GLUCOSE        PTT - ( 01 Oct 2019 23:42 )  PTT:73.1 sec    CULTURES:        Physical Examination:    General: No acute distress.      HEENT: Pupils equal, reactive to light.  Symmetric.    PULM: Clear to auscultation bilaterally, no significant sputum production    CVS: Regular rate and rhythm, no murmurs, rubs, or gallops    ABD: Soft, nondistended, nontender, normoactive bowel sounds, no masses    EXT: Right UE hematoma noted near axilla, tender to palpation, does appear to be stable    SKIN: Warm and well perfused, no rashes noted.    NEURO: Alert, oriented, interactive, nonfocal        RADIOLOGY:   < from: CT Angio Upper Extremity w/ IV Cont, Right (10.01.19 @ 01:36) >  TECHNIQUE:   Imaging protocol: Axial CTA of the Right upper extremity with intravenous   contrast material, including non-contrast images if performed.   3D rendering: MIP reconstructed images were created and reviewed.     COMPARISON:   CT ANGIO CHEST WITHOUT AND OR WITH IV CONTRAST 9/29/2019 8:24 PM     FINDINGS:   Right subclavian artery: No acute findings. No occlusion or significant   stenosis.   Right axillary artery: No acute findings. No occlusion or significant   stenosis.   Right brachial artery: No acute findings. No occlusion or significant   stenosis.   Right radial artery: No acute findings. No occlusion or significant   stenosis.   Right ulnar artery: No acute findings. No occlusion or significant   stenosis.     Bones/joints: No fracture or dislocation.   Soft tissues: Large intraparenchymal hematoma in the medial head of   biceps   muscle measuring approximately 6.4 x 6.5 cm in axial dimension and 11 cm   in   craniocaudal dimension. Tiny foci of active extravasation of contrast   seen on   the arterial phase images with significant extravasated contrast on the   delayed   venous phase images. No evidence of vascular laceration or injury.     IMPRESSION:   Large intramuscular hematoma in the medial proximal biceps muscle   measuring 6.4   x 6.5 x 11 cm with significant active excretion of contrast seen on the   study.        INVASIVE LINES:no  INDWELLING CALVIN:no  VTE PROPHYLAXIS: heparin   CAM ICU: no  CODE STATUS: Full

## 2019-10-02 NOTE — PROGRESS NOTE ADULT - ASSESSMENT
Mr Ramirez is a 69 yo M who initially presented to [facility] ED on 9/29/19 after a near syncopal episode and was found to have Pulmonary embolism with R heart strain and received full dose tPA. PMH HLD, HTN, BPH and GERD.     Massive pulmonary embolism with R heart strain:   -s/p tPA.   -currently on heparin gtt and plans for transition to PO anticoagulation on 10/3/19.   -await further cardiology recommendations.   -patient hemodynamically stable.   -transfer to telemetry bed.   -repeat TTE reviewed.     HTN: had an episode of orthostatic hypotension earlier today.   -continue to monitor blood pressure.   -consider restarting Lisinopril if no further episodes of hypotension. Will continue to hold at this time.     HLD: Restart his statin. Patient normally on Crestor 20mg daily, but will substitute Lipitor.     BPH: continue avodart and flomax.     Leukocytosis: no acute infectious etiology suspected. Continue to trend. Likely reactive.    DVT ppx: on heparin gtt

## 2019-10-02 NOTE — PROGRESS NOTE ADULT - ASSESSMENT
Submassive PE w/ cor pulmonale  RUE intramuscular hematoma  HTN  BPH    Neuro: No active issues  Cardiovascular: Aim for MAP target 65. On heparin gtt. Plan to switch him to NOAC tomorrow. Repeat TTE today  Resp/Chest: Maintain SpO2 > 92%  GI/Nutrition: Diet regular  ID: No active issues  Renal: Avoid nephrotoxic agents. Strict I&O. On Avodart/ Flomax  Endocrinology: Maintain Blood sugar < 180. ISS as per protocol  Haem/Oncology:  DVT ppx w/ gtt. Trend H/H  Musculoskeletal: On Percocet PRN. Vascular Sx f/u    Critical Care time: 35 minutes assessing presenting problems of acute illness that poses high probability of life threatening deterioration or end organ damage/dysfunction.  Medical decision making including Initiating plan of care, reviewing data, reviewing radiology, discussing with multidisciplinary team, non inclusive of procedures, discussing goals of care with patient/family

## 2019-10-02 NOTE — PROGRESS NOTE ADULT - SUBJECTIVE AND OBJECTIVE BOX
Mr Ramirez is a 67 yo M who initially presented to [facility] ED on 9/29/19 after a near syncopal episode and was found to have Pulmonary embolism with R heart strain and received full dose tPA. PMH HLD, HTN, BPH and GERD.   Patient initially had hypotension, hypoxemia, near syncope with evidence of R heart strain so deceived full dose tPA. He was then placed on heparin gtt. His oxygen staurations and hypotension resolved during his ICU admission, he is no longer hypoxic or hypotensive. He did develope R axillary hematoma and his anticoagulation was held as a result. He had a repeat RUE  arterial duplex without evidence of  acitve hemorrhage.   Patient seen and examined at bedside at approximately 11:20 PM. Patient denies any light-headedness, dizziness, SOB, chest pain, abd pain, diarrhea, constipation, melena, hematochezia. His last bowel movement was 2 days ago. He is tolerating PO intake. Denies numbness, tingling or weakness of extremities.     Allergies: NKDA  Past surgical history: denies surgeries  FAmily history: denies history of PE/blood clots in parents  Social history: Denies use of etoh, tobacco and drug use.     T(C): 36.6 (10-02-19 @ 23:44), Max: 37.2 (10-02-19 @ 20:15)  HR: 124 (10-02-19 @ 23:00) (58 - 124)  BP: 133/69 (10-02-19 @ 23:00) (67/42 - 158/88)  RR: 26 (10-02-19 @ 23:00) (15 - 33)  SpO2: 97% (10-02-19 @ 23:00) (92% - 99%)  Wt(kg): --    Physical Exam:   GENERAL: well-groomed, well-developed, NAD  HEENT: head NC/AT; EOM intact, conjunctiva & sclera clear; hearing grossly intact, moist mucous membranes  NECK: supple, no JVD  RESPIRATORY: CTA B/L, no wheezing, rales, rhonchi or rubs  CARDIOVASCULAR: S1&S2, RRR, no murmurs or gallops  ABDOMEN: soft, non-tender, non-distended, + Bowel sounds x4 quadrants, no guarding, rebound or rigidity  MUSCULOSKELETAL:  no clubbing, cyanosis or edema of all 4 extremities  LYMPH: no cervical lymphadenopathy  VASCULAR: Radial pulses 2+ bilaterally, no varicose veins   SKIN: warm and dry, color normal  NEUROLOGIC: AA&O X3, CN2-12 intact w/ no focal deficits, no sensory loss, motor Strength 5/5 in UE & LE B/L  Psych: Agitated, normal behavior

## 2019-10-03 LAB
ANION GAP SERPL CALC-SCNC: 11 MMOL/L — SIGNIFICANT CHANGE UP (ref 5–17)
APTT BLD: 62.1 SEC — HIGH (ref 27.5–36.3)
BUN SERPL-MCNC: 9 MG/DL — SIGNIFICANT CHANGE UP (ref 8–20)
CALCIUM SERPL-MCNC: 8.8 MG/DL — SIGNIFICANT CHANGE UP (ref 8.6–10.2)
CHLORIDE SERPL-SCNC: 102 MMOL/L — SIGNIFICANT CHANGE UP (ref 98–107)
CO2 SERPL-SCNC: 25 MMOL/L — SIGNIFICANT CHANGE UP (ref 22–29)
CREAT SERPL-MCNC: 0.79 MG/DL — SIGNIFICANT CHANGE UP (ref 0.5–1.3)
GLUCOSE SERPL-MCNC: 128 MG/DL — HIGH (ref 70–115)
HCT VFR BLD CALC: 36.4 % — LOW (ref 39–50)
HGB BLD-MCNC: 12.1 G/DL — LOW (ref 13–17)
MAGNESIUM SERPL-MCNC: 2.2 MG/DL — SIGNIFICANT CHANGE UP (ref 1.6–2.6)
MCHC RBC-ENTMCNC: 29.9 PG — SIGNIFICANT CHANGE UP (ref 27–34)
MCHC RBC-ENTMCNC: 33.2 GM/DL — SIGNIFICANT CHANGE UP (ref 32–36)
MCV RBC AUTO: 89.9 FL — SIGNIFICANT CHANGE UP (ref 80–100)
PHOSPHATE SERPL-MCNC: 3.7 MG/DL — SIGNIFICANT CHANGE UP (ref 2.4–4.7)
PLATELET # BLD AUTO: 251 K/UL — SIGNIFICANT CHANGE UP (ref 150–400)
POTASSIUM SERPL-MCNC: 4.3 MMOL/L — SIGNIFICANT CHANGE UP (ref 3.5–5.3)
POTASSIUM SERPL-SCNC: 4.3 MMOL/L — SIGNIFICANT CHANGE UP (ref 3.5–5.3)
RBC # BLD: 4.05 M/UL — LOW (ref 4.2–5.8)
RBC # FLD: 12.7 % — SIGNIFICANT CHANGE UP (ref 10.3–14.5)
SODIUM SERPL-SCNC: 138 MMOL/L — SIGNIFICANT CHANGE UP (ref 135–145)
WBC # BLD: 16.33 K/UL — HIGH (ref 3.8–10.5)
WBC # FLD AUTO: 16.33 K/UL — HIGH (ref 3.8–10.5)

## 2019-10-03 PROCEDURE — 99232 SBSQ HOSP IP/OBS MODERATE 35: CPT

## 2019-10-03 PROCEDURE — 93971 EXTREMITY STUDY: CPT | Mod: 26,RT

## 2019-10-03 PROCEDURE — 99233 SBSQ HOSP IP/OBS HIGH 50: CPT

## 2019-10-03 PROCEDURE — 93931 UPPER EXTREMITY STUDY: CPT | Mod: 26,RT

## 2019-10-03 RX ORDER — DOCUSATE SODIUM 100 MG
100 CAPSULE ORAL
Refills: 0 | Status: DISCONTINUED | OUTPATIENT
Start: 2019-10-03 | End: 2019-10-05

## 2019-10-03 RX ORDER — HYDROMORPHONE HYDROCHLORIDE 2 MG/ML
0.5 INJECTION INTRAMUSCULAR; INTRAVENOUS; SUBCUTANEOUS ONCE
Refills: 0 | Status: DISCONTINUED | OUTPATIENT
Start: 2019-10-03 | End: 2019-10-03

## 2019-10-03 RX ORDER — ATORVASTATIN CALCIUM 80 MG/1
80 TABLET, FILM COATED ORAL AT BEDTIME
Refills: 0 | Status: DISCONTINUED | OUTPATIENT
Start: 2019-10-03 | End: 2019-10-05

## 2019-10-03 RX ORDER — HYDROMORPHONE HYDROCHLORIDE 2 MG/ML
1 INJECTION INTRAMUSCULAR; INTRAVENOUS; SUBCUTANEOUS ONCE
Refills: 0 | Status: DISCONTINUED | OUTPATIENT
Start: 2019-10-03 | End: 2019-10-03

## 2019-10-03 RX ORDER — SENNA PLUS 8.6 MG/1
2 TABLET ORAL AT BEDTIME
Refills: 0 | Status: DISCONTINUED | OUTPATIENT
Start: 2019-10-03 | End: 2019-10-05

## 2019-10-03 RX ORDER — POLYETHYLENE GLYCOL 3350 17 G/17G
17 POWDER, FOR SOLUTION ORAL DAILY
Refills: 0 | Status: DISCONTINUED | OUTPATIENT
Start: 2019-10-03 | End: 2019-10-05

## 2019-10-03 RX ORDER — APIXABAN 2.5 MG/1
10 TABLET, FILM COATED ORAL EVERY 12 HOURS
Refills: 0 | Status: DISCONTINUED | OUTPATIENT
Start: 2019-10-03 | End: 2019-10-05

## 2019-10-03 RX ORDER — MORPHINE SULFATE 50 MG/1
2 CAPSULE, EXTENDED RELEASE ORAL ONCE
Refills: 0 | Status: DISCONTINUED | OUTPATIENT
Start: 2019-10-03 | End: 2019-10-03

## 2019-10-03 RX ADMIN — Medication 100 MILLIGRAM(S): at 17:22

## 2019-10-03 RX ADMIN — SENNA PLUS 2 TABLET(S): 8.6 TABLET ORAL at 21:34

## 2019-10-03 RX ADMIN — OXYCODONE AND ACETAMINOPHEN 2 TABLET(S): 5; 325 TABLET ORAL at 22:30

## 2019-10-03 RX ADMIN — MORPHINE SULFATE 2 MILLIGRAM(S): 50 CAPSULE, EXTENDED RELEASE ORAL at 17:15

## 2019-10-03 RX ADMIN — DUTASTERIDE 0.5 MILLIGRAM(S): 0.5 CAPSULE, LIQUID FILLED ORAL at 21:34

## 2019-10-03 RX ADMIN — OXYCODONE AND ACETAMINOPHEN 2 TABLET(S): 5; 325 TABLET ORAL at 14:40

## 2019-10-03 RX ADMIN — HYDROMORPHONE HYDROCHLORIDE 1 MILLIGRAM(S): 2 INJECTION INTRAMUSCULAR; INTRAVENOUS; SUBCUTANEOUS at 06:16

## 2019-10-03 RX ADMIN — APIXABAN 10 MILLIGRAM(S): 2.5 TABLET, FILM COATED ORAL at 17:22

## 2019-10-03 RX ADMIN — OXYCODONE AND ACETAMINOPHEN 2 TABLET(S): 5; 325 TABLET ORAL at 21:35

## 2019-10-03 RX ADMIN — OXYCODONE AND ACETAMINOPHEN 2 TABLET(S): 5; 325 TABLET ORAL at 01:39

## 2019-10-03 RX ADMIN — HYDROMORPHONE HYDROCHLORIDE 1 MILLIGRAM(S): 2 INJECTION INTRAMUSCULAR; INTRAVENOUS; SUBCUTANEOUS at 14:06

## 2019-10-03 RX ADMIN — HEPARIN SODIUM 1300 UNIT(S)/HR: 5000 INJECTION INTRAVENOUS; SUBCUTANEOUS at 07:25

## 2019-10-03 RX ADMIN — TAMSULOSIN HYDROCHLORIDE 0.4 MILLIGRAM(S): 0.4 CAPSULE ORAL at 21:34

## 2019-10-03 RX ADMIN — PANTOPRAZOLE SODIUM 40 MILLIGRAM(S): 20 TABLET, DELAYED RELEASE ORAL at 08:45

## 2019-10-03 RX ADMIN — HYDROMORPHONE HYDROCHLORIDE 0.5 MILLIGRAM(S): 2 INJECTION INTRAMUSCULAR; INTRAVENOUS; SUBCUTANEOUS at 02:49

## 2019-10-03 RX ADMIN — OXYCODONE AND ACETAMINOPHEN 2 TABLET(S): 5; 325 TABLET ORAL at 01:09

## 2019-10-03 RX ADMIN — OXYCODONE AND ACETAMINOPHEN 2 TABLET(S): 5; 325 TABLET ORAL at 11:10

## 2019-10-03 RX ADMIN — OXYCODONE AND ACETAMINOPHEN 2 TABLET(S): 5; 325 TABLET ORAL at 10:54

## 2019-10-03 RX ADMIN — OXYCODONE AND ACETAMINOPHEN 2 TABLET(S): 5; 325 TABLET ORAL at 15:57

## 2019-10-03 RX ADMIN — HYDROMORPHONE HYDROCHLORIDE 0.5 MILLIGRAM(S): 2 INJECTION INTRAMUSCULAR; INTRAVENOUS; SUBCUTANEOUS at 02:19

## 2019-10-03 RX ADMIN — MORPHINE SULFATE 2 MILLIGRAM(S): 50 CAPSULE, EXTENDED RELEASE ORAL at 17:14

## 2019-10-03 NOTE — PROGRESS NOTE ADULT - ASSESSMENT
Continued neurovascular checks throughout the night.   Application of pressure dressing to right upper extremity above the elbow.

## 2019-10-03 NOTE — CHART NOTE - NSCHARTNOTEFT_GEN_A_CORE
Source: Patient [x ]  Family [ ]   other [ ]    Current Diet: DASH/TLC    PO intake:    %     Source for PO intake [x ] Patient [ ] family [x ] chart [ ] staff [ ] other    Current Weight:   (10/3) 201.9 lbs  (10/2) 200.8 lbs    Pertinent Medications: MEDICATIONS  (STANDING):  atorvastatin 80 milliGRAM(s) Oral at bedtime  dutasteride 0.5 milliGRAM(s) Oral daily  heparin  Infusion.  Unit(s)/Hr (15 mL/Hr) IV Continuous <Continuous>  influenza   Vaccine 0.5 milliLiter(s) IntraMuscular once  pantoprazole    Tablet 40 milliGRAM(s) Oral before breakfast  tamsulosin 0.4 milliGRAM(s) Oral at bedtime    MEDICATIONS  (PRN):  ALPRAZolam 0.25 milliGRAM(s) Oral every 8 hours PRN Anxiety  heparin  Injectable 6500 Unit(s) IV Push every 6 hours PRN For aPTT less than 40  heparin  Injectable 3000 Unit(s) IV Push every 6 hours PRN For aPTT between 40 - 57  oxyCODONE    5 mG/acetaminophen 325 mG 1 Tablet(s) Oral every 4 hours PRN Moderate Pain (4 - 6)  oxyCODONE    5 mG/acetaminophen 325 mG 2 Tablet(s) Oral every 4 hours PRN Severe Pain (7 - 10)  simethicone 80 milliGRAM(s) Chew every 8 hours PRN Gas    Pertinent Labs: CBC Full  -  ( 03 Oct 2019 06:47 )  WBC Count : 16.33 K/uL  RBC Count : 4.05 M/uL  Hemoglobin : 12.1 g/dL  Hematocrit : 36.4 %  Platelet Count - Automated : 251 K/uL  Mean Cell Volume : 89.9 fl  Mean Cell Hemoglobin : 29.9 pg  Mean Cell Hemoglobin Concentration : 33.2 gm/dL    10-03 Na138 mmol/L Glu 128 mg/dL<H> K+ 4.3 mmol/L Cr  0.79 mg/dL BUN 9.0 mg/dL Phos 3.7 mg/dL Alb n/a   PAB n/a         Skin: +1 edema R arm/ hand     Nutrition focused physical exam previously conducted - found signs of malnutrition [ ]absent [ x]present    Subcutaneous fat loss: [ ] Orbital fat pads region, [ ]Buccal fat region, [ ]Triceps region,  [ ]Ribs region    Muscle wasting: [x ]Temples region, [ ]Clavicle region, [ ]Shoulder region, [ ]Scapula region, [ ]Interosseous region,  [ ]thigh region, [ ]Calf region    Estimated Needs:   [x ] no change since previous assessment  [ ] recalculated:     Current Nutrition Diagnosis:  Pt remains at nutritional risk and meets criteria for moderate acute malnutrition moderate acute related to inadequate protein-energy intake in setting of generally not feeling well with decreased appetite/PO intake as evidenced by 6lb wt loss x 2 weeks, mild temple wasting, meeting <75% needs >10 days PTA.   Spoke with pt and wife at bedside, pt stated po intake has slightly improved, stated he doesn't like the food, denied diet ed/food presences/ nutrition supplement.     Goal/Expected Outcome Pt will consume >75% of estimated energy needs to improve nutrition status.    Recommendations:   Monitor wts  Encourage po intake   MVI daily   RD remain available for diet education     Monitoring and Evaluation:   [x ] PO intake [x ] Tolerance to diet prescription [X] Weights  [X] Follow up per protocol [X] Labs:

## 2019-10-03 NOTE — PROGRESS NOTE ADULT - SUBJECTIVE AND OBJECTIVE BOX
CALE MCALLISTER Patient is a 68y old  Male who presents with a chief complaint of Pulmonary Embolism (03 Oct 2019 15:19)     HPI:  68M with PMHx of HTN, HLD, BPH who presented from home BIBEMS with near-syncope and severe dyspnea. Pt states he has not been feeling well for a few days. Pt seen at [facility] earlier in week with c/o of groin pain. States he was told he had an inguinal hernia which they reduced with some improvement in pain, although he admits to a cough and some SOB for past week or so. Further questioning of pt, he admits to multiple flights over past few months with most recent flight approximately 10 days ago. Yesterday with acutely worsening SOB, diaphoretic, near syncope where he almost blacked out while attempting to start his grill. Following event wife called EMS and pt was brought to ED. Upon presentation to ED found to hypoxemic and borderline hypotensive with SBP in 90's, and tachycardic in 120's.  CTA performed and revealed a large saddle PE with right heart strain and extensive b/l emboli. Pt started on heparin drip, placed on 2L N/C. ICU was consulted along with PERT team for further management. (30 Sep 2019 00:25)    The patient was seen and evaluated   The patient is in no acute distress.  Denied any fever chest pain, palpitations, shortness of breath, abdominal pain, fever, dysuria, cough, edema       I&O's Summary    02 Oct 2019 07:01  -  03 Oct 2019 07:00  --------------------------------------------------------  IN: 1026 mL / OUT: 2175 mL / NET: -1149 mL    03 Oct 2019 07:01  -  03 Oct 2019 16:23  --------------------------------------------------------  IN: 26 mL / OUT: 825 mL / NET: -799 mL      Allergies    No Known Allergies    Intolerances      HEALTH ISSUES - PROBLEM Dx:  Hypertension, unspecified type: Hypertension, unspecified type  Hematoma of arm, right, initial encounter: Hematoma of arm, right, initial encounter  Cough due to ACE inhibitor: Cough due to ACE inhibitor  Cough: Cough  DVT (deep venous thrombosis): DVT (deep venous thrombosis)  Cor pulmonale, acute: Cor pulmonale, acute  Suspected deep vein thrombosis (DVT)  Pulmonary thromboembolism  Acute saddle pulmonary embolism with acute cor pulmonale: Acute saddle pulmonary embolism with acute cor pulmonale        PAST MEDICAL & SURGICAL HISTORY:  Benign prostate hyperplasia  Hyperlipidemia  Hypertension          Vital Signs Last 24 Hrs  T(C): 36.9 (03 Oct 2019 15:52), Max: 37.2 (02 Oct 2019 20:15)  T(F): 98.4 (03 Oct 2019 15:52), Max: 98.9 (02 Oct 2019 20:15)  HR: 86 (03 Oct 2019 15:52) (78 - 124)  BP: 146/81 (03 Oct 2019 15:52) (122/62 - 163/87)  BP(mean): 94 (03 Oct 2019 10:00) (88 - 116)  RR: 18 (03 Oct 2019 15:52) (14 - 49)  SpO2: 97% (03 Oct 2019 15:52) (91% - 97%)T(C): 36.9 (10-03-19 @ 15:52), Max: 37.2 (10-02-19 @ 20:15)  HR: 86 (10-03-19 @ 15:52) (78 - 124)  BP: 146/81 (10-03-19 @ 15:52) (122/62 - 163/87)  RR: 18 (10-03-19 @ 15:52) (14 - 49)  SpO2: 97% (10-03-19 @ 15:52) (91% - 97%)  Wt(kg): --    PHYSICAL EXAM:    GENERAL: NAD,   HEAD:  Atraumatic, Normocephalic  EYES: EOMI, PERRL conjunctiva and sclera clear  NERVOUS SYSTEM:  Alert & Oriented X3,  Moves upper and lower extremities; CNS-II-XII  CHEST/LUNG: Clear to auscultation bilaterally; No rales, rhonchi, wheezing,   HEART: Regular rate and rhythm; No murmurs,   ABDOMEN: Soft, Nontender, Nondistended; Bowel sounds present  EXTREMITIES:  Peripheral Pulses, No  cyanosis, or edema      ALPRAZolam 0.25 milliGRAM(s) Oral every 8 hours PRN  atorvastatin 80 milliGRAM(s) Oral at bedtime  docusate sodium 100 milliGRAM(s) Oral two times a day  dutasteride 0.5 milliGRAM(s) Oral daily  heparin  Infusion.  Unit(s)/Hr IV Continuous <Continuous>  heparin  Injectable 6500 Unit(s) IV Push every 6 hours PRN  heparin  Injectable 3000 Unit(s) IV Push every 6 hours PRN  influenza   Vaccine 0.5 milliLiter(s) IntraMuscular once  oxyCODONE    5 mG/acetaminophen 325 mG 1 Tablet(s) Oral every 4 hours PRN  oxyCODONE    5 mG/acetaminophen 325 mG 2 Tablet(s) Oral every 4 hours PRN  pantoprazole    Tablet 40 milliGRAM(s) Oral before breakfast  polyethylene glycol 3350 17 Gram(s) Oral daily  senna 2 Tablet(s) Oral at bedtime  simethicone 80 milliGRAM(s) Chew every 8 hours PRN  tamsulosin 0.4 milliGRAM(s) Oral at bedtime      LABS:                          12.1   16.33 )-----------( 251      ( 03 Oct 2019 06:47 )             36.4     10-03    138  |  102  |  9.0  ----------------------------<  128<H>  4.3   |  25.0  |  0.79    Ca    8.8      03 Oct 2019 06:47  Phos  3.7     10-03  Mg     2.2     10-03        PTT - ( 03 Oct 2019 06:48 )  PTT:62.1 sec  CARDIAC MARKERS ( 02 Oct 2019 06:10 )  x     / 0.04 ng/mL / x     / x     / x            CAPILLARY BLOOD GLUCOSE          RADIOLOGY & ADDITIONAL TESTS:      Consultant notes reviewed    Case discussed with consultant/provider/ family /patient

## 2019-10-03 NOTE — PROGRESS NOTE ADULT - ATTENDING COMMENTS
I was present at bedside in radiology as duplex was being performed.    No active bleeding or pseudoaneurysm. All arteries and veins are patent with good flow. Hematoma is unchanged but slightly widened in size from prior measurement. Likely due to layering of blood over time. Upper arm is soft. Neuro sensory and motor exam in normal except for decreased sensation of fingertips and tingling.     He is c/o pain at the right elbow radiating to the wrist. Slight discomfort in the medial upper arm and axilla, unchanged from prior exams on previous days. Denies trauma to or overexertion of LUE. He denies pain in the LUE or left hand/ digits while at rest.    His symptoms of parasthesia does not appear to be consistent with compression of nerve in axillary sheath.  Will continue to monitor symptoms and neurovascular checks throughout the evening.    If neurologic symptoms worsen, may require evacuation of hematoma and decompression of axillary sheath in the OR

## 2019-10-03 NOTE — CHART NOTE - NSCHARTNOTEFT_GEN_A_CORE
HPI:  68M with PMHx of HTN, HLD, BPH who presented from home BIBEMS with near-syncope and severe dyspnea. Pt states he has not been feeling well for a few days. Pt seen at [facility] earlier in week with c/o of groin pain. States he was told he had an inguinal hernia which they reduced with some improvement in pain, although he admits to a cough and some SOB for past week or so. Further questioning of pt, he admits to multiple flights over past few months with most recent flight approximately 10 days ago. Yesterday with acutely worsening SOB, diaphoretic, near syncope where he almost blacked out while attempting to start his grill. Following event wife called EMS and pt was brought to ED. Upon presentation to ED found to hypoxemic and borderline hypotensive with SBP in 90's, and tachycardic in 120's.  CTA performed and revealed a large saddle PE with right heart strain and extensive b/l emboli. Pt started on heparin drip, placed on 2L N/C. ICU was consulted along with PERT team for further management. (30 Sep 2019 00:25)    EVENT NOTE:   patient with intense pain in right arm, will give morphine 2mg IVP stat for pain control, called vascular surgery and vascular surgery will assess at bedside. as per my assessment, arm is warm, with good palpable pulse, no tense compartments, range of motion of fingers intact, sensation intact, no bulging/hematoma/bruising/edema. capillary refill normal.

## 2019-10-03 NOTE — PROGRESS NOTE ADULT - SUBJECTIVE AND OBJECTIVE BOX
Patient with complaint of right arm swelling proximal from antecubital fossa with associated tingling sensation of the fingers. He reports he overall feels much better and has no change in tingling sensation from initial examination. heating packs are best at alleviated discomfort. denies numbness, motor function intact of entire right upper extremity. No CP or SOB.     MEDICATIONS  (STANDING):  apixaban 10 milliGRAM(s) Oral every 12 hours  atorvastatin 80 milliGRAM(s) Oral at bedtime  docusate sodium 100 milliGRAM(s) Oral two times a day  dutasteride 0.5 milliGRAM(s) Oral daily  influenza   Vaccine 0.5 milliLiter(s) IntraMuscular once  pantoprazole    Tablet 40 milliGRAM(s) Oral before breakfast  polyethylene glycol 3350 17 Gram(s) Oral daily  senna 2 Tablet(s) Oral at bedtime  tamsulosin 0.4 milliGRAM(s) Oral at bedtime    MEDICATIONS  (PRN):  ALPRAZolam 0.25 milliGRAM(s) Oral every 8 hours PRN Anxiety  heparin  Injectable 6500 Unit(s) IV Push every 6 hours PRN For aPTT less than 40  heparin  Injectable 3000 Unit(s) IV Push every 6 hours PRN For aPTT between 40 - 57  oxyCODONE    5 mG/acetaminophen 325 mG 1 Tablet(s) Oral every 4 hours PRN Moderate Pain (4 - 6)  oxyCODONE    5 mG/acetaminophen 325 mG 2 Tablet(s) Oral every 4 hours PRN Severe Pain (7 - 10)  simethicone 80 milliGRAM(s) Chew every 8 hours PRN Gas      Vital Signs Last 24 Hrs  T(C): 36.8 (03 Oct 2019 21:30), Max: 36.9 (03 Oct 2019 15:52)  T(F): 98.2 (03 Oct 2019 21:30), Max: 98.4 (03 Oct 2019 15:52)  HR: 89 (03 Oct 2019 21:30) (78 - 124)  BP: 122/60 (03 Oct 2019 21:30) (122/60 - 163/87)  BP(mean): 94 (03 Oct 2019 10:00) (88 - 116)  RR: 18 (03 Oct 2019 21:30) (14 - 49)  SpO2: 99% (03 Oct 2019 21:30) (91% - 99%)    Physical Exam:    Neurological:  Tingling sensation of right distal upper extremity. sensation intact and symmetric.   Extremities: swelling of area proximal to right antecubital fossa. medial sided hematoma. non-tender, no induration, compartments soft  Vascular: Equal and normal pulses: 2+ peripheral pulses throughout  Musculoskeletal: No joint pain, swelling or deformity; no limitation of movement      I&O's Detail    02 Oct 2019 07:01  -  03 Oct 2019 07:00  --------------------------------------------------------  IN:    heparin  Infusion.: 286 mL    Oral Fluid: 740 mL  Total IN: 1026 mL    OUT:    Voided: 2175 mL  Total OUT: 2175 mL    Total NET: -1149 mL      03 Oct 2019 07:01  -  03 Oct 2019 22:41  --------------------------------------------------------  IN:    heparin  Infusion.: 26 mL    Solution: 26 mL  Total IN: 52 mL    OUT:    Voided: 1025 mL  Total OUT: 1025 mL    Total NET: -973 mL          LABS:                        12.1   16.33 )-----------( 251      ( 03 Oct 2019 06:47 )             36.4     10-03    138  |  102  |  9.0  ----------------------------<  128<H>  4.3   |  25.0  |  0.79    Ca    8.8      03 Oct 2019 06:47  Phos  3.7     10-03  Mg     2.2     10-03      PTT - ( 03 Oct 2019 06:48 )  PTT:62.1 sec      RADIOLOGY & ADDITIONAL STUDIES:

## 2019-10-03 NOTE — PROGRESS NOTE ADULT - ASSESSMENT
67 y/o M with PMH HLD, HTN, BPH and GERD, admitted to [facility] with acute saddle pulmonary embolism with cor pulmonale, s/p TPA. Hospital course complicated by development of RUE hematoma. Patient is hemodynamically stable. No events on telemetry. Tolerating heparin gtt. RUE decreased in size. Start PO eliquis, with initial dose of 10mg twice daily x 7 days, followed by 5mg twice daily. Would repeat echo as outpatient in 1 month to re-evaluate RV function.     Plan discussed with patient and Dr. Cruz

## 2019-10-03 NOTE — CHART NOTE - NSCHARTNOTEFT_GEN_A_CORE
HPI:  68M with PMHx of HTN, HLD, BPH who presented from home BIBEMS with near-syncope and severe dyspnea. Pt states he has not been feeling well for a few days. Pt seen at [facility] earlier in week with c/o of groin pain. States he was told he had an inguinal hernia which they reduced with some improvement in pain, although he admits to a cough and some SOB for past week or so. Further questioning of pt, he admits to multiple flights over past few months with most recent flight approximately 10 days ago. Yesterday with acutely worsening SOB, diaphoretic, near syncope where he almost blacked out while attempting to start his grill. Following event wife called EMS and pt was brought to ED. Upon presentation to ED found to hypoxemic and borderline hypotensive with SBP in 90's, and tachycardic in 120's.  CTA performed and revealed a large saddle PE with right heart strain and extensive b/l emboli. Pt started on heparin drip, placed on 2L N/C. ICU was consulted along with PERT team for further management. (30 Sep 2019 00:25)      Event:   patient constipated called by RN, no s/s of acute abdomen, patient passing gas. on narcotic pain meds. will give colace/senna/miralax

## 2019-10-03 NOTE — PROGRESS NOTE ADULT - ASSESSMENT
67yo male known to service from previous consult    Unprovoked DVT of Left Popliteal, admitted with PE.  Systemic TPA performed.      Subsequently found with Right axillary hematoma with extravasation.    Now with acute pain.    Must rule out recurrence of bleeding, hematoma increase and compartment syndrome    Recommend stat arterial and venous duplex of RUE    Will follow

## 2019-10-03 NOTE — PROGRESS NOTE ADULT - ASSESSMENT
69 yo M who initially presented to [facility] ED on 9/29/19 after a near syncopal episode and was found to have Pulmonary embolism with R heart strain and received full dose tPA. PMH HLD, HTN, BPH and GERD.     Massive pulmonary embolism with R heart strain:   -s/p tPA.   -started Eliquis today- DC heparin- okayed by cardio .   -patient hemodynamically stable.   on telemetry bed.   -repeat TTE reviewed.     HTN: had an episode of orthostatic hypotension earlier today.   -continue to monitor blood pressure.   -consider restarting Lisinopril if no further episodes of hypotension. Will continue to hold at this time.     HLD: Restart his statin. Patient normally on Crestor 20mg daily, but will substitute Lipitor.     BPH: continue avodart and flomax.     Leukocytosis: no acute infectious etiology suspected. Continue to trend. Likely reactive.

## 2019-10-03 NOTE — PROGRESS NOTE ADULT - SUBJECTIVE AND OBJECTIVE BOX
[facility] [facility]                                                        Office: [address] [address]                                                       Telephone: [phone]. Fax:[phone]                                                                             PROGRESS NOTE      CC: Patient is a 68y old  Male who presents with a chief complaint of Pulmonary Embolism (02 Oct 2019 23:39)    Interval history: Patient seen sitting out of bed to chair with daughter and wife at bedside. Feels well with no complaints. Admits to some RUE pain, which he states is well controlled with pain meds (on percocet PRN). Denies CP, palpitations, dyspnea. No events on telemetry.     Review of symptoms: All review of systems negative unless otherwise indicated below    Past medical history: No updates.   Chronic conditions:  Hypertension: controlled. CHF: Stable. CAD: Stable ischemic heart disease. DM: Stable;    	  Vitals:  T(C): 36.5 (10-03-19 @ 08:41), Max: 37.2 (10-02-19 @ 20:15)  HR: 83 (10-03-19 @ 15:00) (78 - 124)  BP: 122/62 (10-03-19 @ 12:09) (122/62 - 163/87)  RR: 18 (10-03-19 @ 15:00) (14 - 49)  SpO2: 95% (10-03-19 @ 15:00) (91% - 98%)  Wt(kg): --  I&O's Summary    02 Oct 2019 07:01  -  03 Oct 2019 07:00  --------------------------------------------------------  IN: 1026 mL / OUT: 2175 mL / NET: -1149 mL    03 Oct 2019 07:01  -  03 Oct 2019 15:19  --------------------------------------------------------  IN: 26 mL / OUT: 400 mL / NET: -374 mL      Weight (kg): 83.6 (09-29 @ 20:54)    PHYSICAL EXAM:  Appearance: Comfortable. No acute distress  HEENT:  Head and neck: Atraumatic. Normocephalic.  Normal oral mucosa, PERRL, Neck is supple. No JVD, No carotid bruit.   Neurologic: A & O x 3, no focal deficits. EOMI , Cranial nerves are intact.  Lymphatic: No cervical lymphadenopathy  Cardiovascular: Normal S1 S2, No murmur, rubs/gallops. No JVD, No edema  Respiratory: Lungs clear to auscultation  Gastrointestinal:  Soft, Non-tender, + BS  Lower Extremities: No edema  Psychiatry: Patient is calm. No agitation. Mood & affect appropriate  Skin: No rashes/ ecchymoses/cyanosis/ulcers visualized on the face, hands or feet.    CURRENT MEDICATIONS:  tamsulosin 0.4 milliGRAM(s) Oral at bedtime    docusate sodium  pantoprazole    Tablet  polyethylene glycol 3350  senna  atorvastatin  dutasteride  heparin  Infusion.  influenza   Vaccine      LABS:	 	  CARDIAC MARKERS ( 02 Oct 2019 06:10 )  x     / 0.04 ng/mL / x     / x     / x      p-BNP 02 Oct 2019 06:10: x                                12.1   16.33 )-----------( 251      ( 03 Oct 2019 06:47 )             36.4     10-03    138  |  102  |  9.0  ----------------------------<  128<H>  4.3   |  25.0  |  0.79    Ca    8.8      03 Oct 2019 06:47  Phos  3.7     10-03  Mg     2.2     10-03      proBNP: Serum Pro-Brain Natriuretic Peptide: 31 pg/mL (09-29 @ 19:46)    Lipid Profile:   HgA1c: TSH:     TELEMETRY: Reviewed    ECG:  Reviewed by me. 	    DIAGNOSTIC TESTING:  [ ] Echocardiogram:   [ ]  Catheterization:  [ ] Stress Test:    OTHER:

## 2019-10-04 LAB
ANION GAP SERPL CALC-SCNC: 12 MMOL/L — SIGNIFICANT CHANGE UP (ref 5–17)
APTT BLD: 28.1 SEC — SIGNIFICANT CHANGE UP (ref 27.5–36.3)
BUN SERPL-MCNC: 10 MG/DL — SIGNIFICANT CHANGE UP (ref 8–20)
CALCIUM SERPL-MCNC: 8.8 MG/DL — SIGNIFICANT CHANGE UP (ref 8.6–10.2)
CHLORIDE SERPL-SCNC: 98 MMOL/L — SIGNIFICANT CHANGE UP (ref 98–107)
CO2 SERPL-SCNC: 25 MMOL/L — SIGNIFICANT CHANGE UP (ref 22–29)
CREAT SERPL-MCNC: 0.91 MG/DL — SIGNIFICANT CHANGE UP (ref 0.5–1.3)
GLUCOSE SERPL-MCNC: 119 MG/DL — HIGH (ref 70–115)
HCT VFR BLD CALC: 35.6 % — LOW (ref 39–50)
HGB BLD-MCNC: 11.7 G/DL — LOW (ref 13–17)
MCHC RBC-ENTMCNC: 30.1 PG — SIGNIFICANT CHANGE UP (ref 27–34)
MCHC RBC-ENTMCNC: 32.9 GM/DL — SIGNIFICANT CHANGE UP (ref 32–36)
MCV RBC AUTO: 91.5 FL — SIGNIFICANT CHANGE UP (ref 80–100)
PLATELET # BLD AUTO: 288 K/UL — SIGNIFICANT CHANGE UP (ref 150–400)
POTASSIUM SERPL-MCNC: 4.4 MMOL/L — SIGNIFICANT CHANGE UP (ref 3.5–5.3)
POTASSIUM SERPL-SCNC: 4.4 MMOL/L — SIGNIFICANT CHANGE UP (ref 3.5–5.3)
RBC # BLD: 3.89 M/UL — LOW (ref 4.2–5.8)
RBC # FLD: 13 % — SIGNIFICANT CHANGE UP (ref 10.3–14.5)
SODIUM SERPL-SCNC: 135 MMOL/L — SIGNIFICANT CHANGE UP (ref 135–145)
WBC # BLD: 13.08 K/UL — HIGH (ref 3.8–10.5)
WBC # FLD AUTO: 13.08 K/UL — HIGH (ref 3.8–10.5)

## 2019-10-04 PROCEDURE — 99232 SBSQ HOSP IP/OBS MODERATE 35: CPT

## 2019-10-04 RX ORDER — LACTULOSE 10 G/15ML
15 SOLUTION ORAL DAILY
Refills: 0 | Status: DISCONTINUED | OUTPATIENT
Start: 2019-10-04 | End: 2019-10-05

## 2019-10-04 RX ADMIN — OXYCODONE AND ACETAMINOPHEN 2 TABLET(S): 5; 325 TABLET ORAL at 17:46

## 2019-10-04 RX ADMIN — Medication 100 MILLIGRAM(S): at 05:21

## 2019-10-04 RX ADMIN — OXYCODONE AND ACETAMINOPHEN 2 TABLET(S): 5; 325 TABLET ORAL at 02:51

## 2019-10-04 RX ADMIN — OXYCODONE AND ACETAMINOPHEN 2 TABLET(S): 5; 325 TABLET ORAL at 08:33

## 2019-10-04 RX ADMIN — SENNA PLUS 2 TABLET(S): 8.6 TABLET ORAL at 21:43

## 2019-10-04 RX ADMIN — PANTOPRAZOLE SODIUM 40 MILLIGRAM(S): 20 TABLET, DELAYED RELEASE ORAL at 05:21

## 2019-10-04 RX ADMIN — TAMSULOSIN HYDROCHLORIDE 0.4 MILLIGRAM(S): 0.4 CAPSULE ORAL at 21:43

## 2019-10-04 RX ADMIN — LACTULOSE 15 GRAM(S): 10 SOLUTION ORAL at 21:43

## 2019-10-04 RX ADMIN — APIXABAN 10 MILLIGRAM(S): 2.5 TABLET, FILM COATED ORAL at 05:21

## 2019-10-04 RX ADMIN — DUTASTERIDE 0.5 MILLIGRAM(S): 0.5 CAPSULE, LIQUID FILLED ORAL at 21:45

## 2019-10-04 RX ADMIN — OXYCODONE AND ACETAMINOPHEN 2 TABLET(S): 5; 325 TABLET ORAL at 18:56

## 2019-10-04 RX ADMIN — ATORVASTATIN CALCIUM 80 MILLIGRAM(S): 80 TABLET, FILM COATED ORAL at 05:21

## 2019-10-04 RX ADMIN — OXYCODONE AND ACETAMINOPHEN 2 TABLET(S): 5; 325 TABLET ORAL at 07:42

## 2019-10-04 RX ADMIN — OXYCODONE AND ACETAMINOPHEN 2 TABLET(S): 5; 325 TABLET ORAL at 21:44

## 2019-10-04 RX ADMIN — OXYCODONE AND ACETAMINOPHEN 2 TABLET(S): 5; 325 TABLET ORAL at 03:50

## 2019-10-04 RX ADMIN — OXYCODONE AND ACETAMINOPHEN 2 TABLET(S): 5; 325 TABLET ORAL at 13:09

## 2019-10-04 RX ADMIN — Medication 100 MILLIGRAM(S): at 17:45

## 2019-10-04 RX ADMIN — Medication 0.25 MILLIGRAM(S): at 08:32

## 2019-10-04 RX ADMIN — APIXABAN 10 MILLIGRAM(S): 2.5 TABLET, FILM COATED ORAL at 17:45

## 2019-10-04 RX ADMIN — POLYETHYLENE GLYCOL 3350 17 GRAM(S): 17 POWDER, FOR SOLUTION ORAL at 07:42

## 2019-10-04 NOTE — PROGRESS NOTE ADULT - PROVIDER SPECIALTY LIST ADULT
Cardiology
Critical Care
Hospitalist
Pulmonology
Vascular Surgery
Pulmonology

## 2019-10-04 NOTE — PROGRESS NOTE ADULT - REASON FOR ADMISSION
Pulmonary Embolism

## 2019-10-04 NOTE — PROGRESS NOTE ADULT - ASSESSMENT
No acute vascular surgical interventions needed at this time.   Pain and tingling sensation subsided. Neurovascularly intact  Continued neurovascular evaluation

## 2019-10-04 NOTE — PROGRESS NOTE ADULT - SUBJECTIVE AND OBJECTIVE BOX
INTERVAL HPI/OVERNIGHT EVENTS:    Patient evaluated at bedside. No acute events overnight. Patient reports he has no pain, tingling sensation has subsided. He reports no new acute issues at this time. no CP or SOB.    MEDICATIONS  (STANDING):  apixaban 10 milliGRAM(s) Oral every 12 hours  atorvastatin 80 milliGRAM(s) Oral at bedtime  docusate sodium 100 milliGRAM(s) Oral two times a day  dutasteride 0.5 milliGRAM(s) Oral daily  influenza   Vaccine 0.5 milliLiter(s) IntraMuscular once  pantoprazole    Tablet 40 milliGRAM(s) Oral before breakfast  polyethylene glycol 3350 17 Gram(s) Oral daily  senna 2 Tablet(s) Oral at bedtime  tamsulosin 0.4 milliGRAM(s) Oral at bedtime    MEDICATIONS  (PRN):  ALPRAZolam 0.25 milliGRAM(s) Oral every 8 hours PRN Anxiety  heparin  Injectable 6500 Unit(s) IV Push every 6 hours PRN For aPTT less than 40  heparin  Injectable 3000 Unit(s) IV Push every 6 hours PRN For aPTT between 40 - 57  oxyCODONE    5 mG/acetaminophen 325 mG 1 Tablet(s) Oral every 4 hours PRN Moderate Pain (4 - 6)  oxyCODONE    5 mG/acetaminophen 325 mG 2 Tablet(s) Oral every 4 hours PRN Severe Pain (7 - 10)  simethicone 80 milliGRAM(s) Chew every 8 hours PRN Gas      Vital Signs Last 24 Hrs  T(C): 36.8 (03 Oct 2019 21:30), Max: 36.9 (03 Oct 2019 15:52)  T(F): 98.2 (03 Oct 2019 21:30), Max: 98.4 (03 Oct 2019 15:52)  HR: 89 (03 Oct 2019 21:30) (78 - 107)  BP: 122/60 (03 Oct 2019 21:30) (122/60 - 163/87)  BP(mean): 94 (03 Oct 2019 10:00) (88 - 116)  RR: 18 (03 Oct 2019 21:30) (14 - 49)  SpO2: 99% (03 Oct 2019 21:30) (91% - 99%)    Physical Exam:    Neurological:  Tingling sensation of right distal upper extremity. sensation intact and symmetric.   Extremities: swelling of area proximal to right antecubital fossa. medial sided hematoma. non-tender, no induration, compartments soft  Vascular: Equal and normal pulses: 2+ peripheral pulses throughout    I&O's Detail    02 Oct 2019 07:01  -  03 Oct 2019 07:00  --------------------------------------------------------  IN:    heparin  Infusion.: 286 mL    Oral Fluid: 740 mL  Total IN: 1026 mL    OUT:    Voided: 2175 mL  Total OUT: 2175 mL    Total NET: -1149 mL      03 Oct 2019 07:01  -  04 Oct 2019 01:25  --------------------------------------------------------  IN:    heparin  Infusion.: 26 mL    Solution: 26 mL  Total IN: 52 mL    OUT:    Voided: 1025 mL  Total OUT: 1025 mL    Total NET: -973 mL          LABS:                        12.1   16.33 )-----------( 251      ( 03 Oct 2019 06:47 )             36.4     10-03    138  |  102  |  9.0  ----------------------------<  128<H>  4.3   |  25.0  |  0.79    Ca    8.8      03 Oct 2019 06:47  Phos  3.7     10-03  Mg     2.2     10-03      PTT - ( 03 Oct 2019 06:48 )  PTT:62.1 sec      RADIOLOGY & ADDITIONAL STUDIES:

## 2019-10-04 NOTE — PROGRESS NOTE ADULT - SUBJECTIVE AND OBJECTIVE BOX
[place] [facility] Practice                                                        Office: [address] [address]                                                       Telephone: [phone]. Fax:[phone]                                                                             PROGRESS NOTE   Reason for follow up:  PE                            Overnight: No new events.   Update:   Right with bruising that appears to be improving.    TELEMETRY:  SR, Sats on RA > 96%.   Patient complaints of pain in right upper extremity, recommending treatement with Toradol if patient tolerates in addition to percocet.     Subjective: "I had a shower and felt the best I felt in days"   Complains of:  Nothing  Review of symptoms: Cardiac:  No chest pain. No dyspnea. No palpitations.  Respiratory: no cough. No dyspnea  Gastrointestinal: No diarrhea. No abdominal pain. No bleeding.     Past medical history: No updates.   Chronic conditions: HEALTH ISSUES - PROBLEM Dx:  Hypertension, unspecified type: Hypertension, unspecified type  Hematoma of arm, right, initial encounter: Hematoma of arm, right, initial encounter  Cough due to ACE inhibitor: Cough due to ACE inhibitor  DVT (deep venous thrombosis): DVT (deep venous thrombosis)  Cor pulmonale, acute: Cor pulmonale, acute  Acute saddle pulmonary embolism with acute cor pulmonale: Acute saddle pulmonary embolism with acute cor pulmonal    	  Vitals:  T(C): 36.4 (10-04-19 @ 15:51), Max: 36.8 (10-03-19 @ 21:30)  HR: 87 (10-04-19 @ 15:51) (83 - 92)  BP: 137/83 (10-04-19 @ 15:51) (122/60 - 137/83)  RR: 18 (10-04-19 @ 15:51) (18 - 18)  SpO2: 100% (10-04-19 @ 15:51) (96% - 100%)  I&O's Summary  03 Oct 2019 07:01  -  04 Oct 2019 07:00  --------------------------------------------------------  IN: 52 mL / OUT: 1575 mL / NET: -1523 mL  04 Oct 2019 07:01  -  04 Oct 2019 15:57  --------------------------------------------------------  IN: 480 mL / OUT: 650 mL / NET: -170 mL  Weight (kg): 83.6 (09-29 @ 20:54)    PHYSICAL EXAM:  Appearance: Comfortable. No acute distress  HEENT:  Head and neck: Atraumatic. Normocephalic.  Normal oral mucosa, PERRL, Neck is supple. No JVD, No carotid bruit.   Neurologic: A & O x 3, no focal deficits. EOMI , Cranial nerves are intact.  Lymphatic: No cervical lymphadenopathy  Cardiovascular: Normal S1 S2, No murmur, rubs/gallops. No JVD, No edema  Respiratory: Lungs clear to auscultation  Gastrointestinal:  Soft, Non-tender, + BS  Lower Extremities: No edema  Psychiatry: Patient is calm. No agitation. Mood & affect appropriate  Skin: No rashes noted.    Right upper arm with ecchymoses/ no cyanosis or ulcers visualized on the face, hands or feet.      CURRENT MEDICATIONS:  tamsulosin 0.4 milliGRAM(s) Oral at bedtime  docusate sodium  lactulose Syrup  pantoprazole    Tablet  polyethylene glycol 3350  senna  atorvastatin  dutasteride  apixaban  influenza   Vaccine    LABS:	 	  CARDIAC MARKERS ( 02 Oct 2019 06:10 )  x     / 0.04 ng/mL / x     / x     / x      p-BNP 02 Oct 2019 06:10: x                              12.1   16.33 )-----------( 251      ( 03 Oct 2019 06:47 )             36.4   10-03  138  |  102  |  9.0  ----------------------------<  128<H>  4.3   |  25.0  |  0.79  Ca    8.8      03 Oct 2019 06:47  Phos  3.7     10-03  Mg     2.2     10-03  proBNP: Serum Pro-Brain Natriuretic Peptide: 31 pg/mL (09-29 @ 19:46)    TELEMETRY:  SR, Sats on RA > 96%.    ECG:  Reviewed by me. 	    DIAGNOSTIC TESTING:  [ ] Echocardiogram:   [ ]  Catheterization:  [ ] Stress Test:    OTHER: [place] [facility] Practice                                                        Office: [address] [address]                                                       Telephone: [phone]. Fax:[phone]                                                                             PROGRESS NOTE   Reason for follow up:  PE                            Overnight: No new events.   Update:   Right with bruising that appears to be improving.    TELEMETRY:  SR, Sats on RA > 96%.   Patient complaints of pain in right upper extremity, recommending treatement with Toradol if patient tolerates in addition to percocet.     Subjective: "I had a shower and felt the best I felt in days"   Complains of:  Nothing  Review of symptoms: Cardiac:  No chest pain. No dyspnea. No palpitations.  Respiratory: no cough. No dyspnea  Gastrointestinal: No diarrhea. No abdominal pain. No bleeding.     Past medical history: No updates.   Chronic conditions: HEALTH ISSUES - PROBLEM Dx:  Hypertension, unspecified type: Hypertension, unspecified type  Hematoma of arm, right, initial encounter: Hematoma of arm, right, initial encounter  Cough due to ACE inhibitor: Cough due to ACE inhibitor  DVT (deep venous thrombosis): DVT (deep venous thrombosis)  Cor pulmonale, acute: Cor pulmonale, acute  Acute saddle pulmonary embolism with acute cor pulmonale: Acute saddle pulmonary embolism with acute cor pulmonal    	  Vitals:  T(C): 36.4 (10-04-19 @ 15:51), Max: 36.8 (10-03-19 @ 21:30)  HR: 87 (10-04-19 @ 15:51) (83 - 92)  BP: 137/83 (10-04-19 @ 15:51) (122/60 - 137/83)  RR: 18 (10-04-19 @ 15:51) (18 - 18)  SpO2: 100% (10-04-19 @ 15:51) (96% - 100%)  I&O's Summary  03 Oct 2019 07:01  -  04 Oct 2019 07:00  --------------------------------------------------------  IN: 52 mL / OUT: 1575 mL / NET: -1523 mL  04 Oct 2019 07:01  -  04 Oct 2019 15:57  --------------------------------------------------------  IN: 480 mL / OUT: 650 mL / NET: -170 mL  Weight (kg): 83.6 (09-29 @ 20:54)    PHYSICAL EXAM:  Appearance: Comfortable. No acute distress  HEENT:  Head and neck: Atraumatic. Normocephalic.  Normal oral mucosa, PERRL, Neck is supple. No JVD, No carotid bruit.   Neurologic: A & O x 3, no focal deficits. EOMI , Cranial nerves are intact.  Lymphatic: No cervical lymphadenopathy  Cardiovascular: Normal S1 S2, No murmur, rubs/gallops. No JVD, No edema  Respiratory: Lungs clear to auscultation  Gastrointestinal:  Soft, Non-tender, + BS  Lower Extremities: No edema  Psychiatry: Patient is calm. No agitation. Mood & affect appropriate  Skin: No rashes noted.    Right upper arm with ecchymoses/ no cyanosis or ulcers visualized on the face, hands or feet.      CURRENT MEDICATIONS:  tamsulosin 0.4 milliGRAM(s) Oral at bedtime  docusate sodium  lactulose Syrup  pantoprazole    Tablet  polyethylene glycol 3350  senna  atorvastatin  dutasteride  apixaban  influenza   Vaccine    LABS:	 	  CARDIAC MARKERS ( 02 Oct 2019 06:10 )  x     / 0.04 ng/mL / x     / x     / x      p-BNP 02 Oct 2019 06:10: x                              12.1   16.33 )-----------( 251      ( 03 Oct 2019 06:47 )             36.4   10-03  138  |  102  |  9.0  ----------------------------<  128<H>  4.3   |  25.0  |  0.79  Ca    8.8      03 Oct 2019 06:47  Phos  3.7     10-03  Mg     2.2     10-03  proBNP: Serum Pro-Brain Natriuretic Peptide: 31 pg/mL (09-29 @ 19:46)    TELEMETRY:  SR, Sats on RA > 96%.

## 2019-10-04 NOTE — PROGRESS NOTE ADULT - ATTENDING COMMENTS
Patient stable.   Bleeding stable in right arm. On po anticoagulation.   Consider NSAID therapy for arm pain if needed.   Follow up outpatient. Patient stable.   Bleeding stable in right arm though decreased hgB from yesterday. No labs today. Will plan for repeat CBC and monitor.  On po anticoagulation.   Consider NSAID therapy for arm pain if needed.   Follow up outpatient.

## 2019-10-04 NOTE — PROGRESS NOTE ADULT - SUBJECTIVE AND OBJECTIVE BOX
CALE MCALLISTER Patient is a 68y old  Male who presents with a chief complaint of Pulmonary Embolism (04 Oct 2019 01:24)     HPI:  68M with PMHx of HTN, HLD, BPH who presented from home BIBEMS with near-syncope and severe dyspnea. Pt states he has not been feeling well for a few days. Pt seen at [facility] earlier in week with c/o of groin pain. States he was told he had an inguinal hernia which they reduced with some improvement in pain, although he admits to a cough and some SOB for past week or so. Further questioning of pt, he admits to multiple flights over past few months with most recent flight approximately 10 days ago. Yesterday with acutely worsening SOB, diaphoretic, near syncope where he almost blacked out while attempting to start his grill. Following event wife called EMS and pt was brought to ED. Upon presentation to ED found to hypoxemic and borderline hypotensive with SBP in 90's, and tachycardic in 120's.  CTA performed and revealed a large saddle PE with right heart strain and extensive b/l emboli. Pt started on heparin drip, placed on 2L N/C. ICU was consulted along with PERT team for further management. (30 Sep 2019 00:25)    The patient was seen and evaluated   The patient is in no acute distress.  Denied any fever chest pain, palpitations, shortness of breath, abdominal pain, fever, dysuria, cough, edema   Complains of complains of constipation     I&O's Summary    03 Oct 2019 07:01  -  04 Oct 2019 07:00  --------------------------------------------------------  IN: 52 mL / OUT: 1575 mL / NET: -1523 mL    04 Oct 2019 07:01  -  04 Oct 2019 14:38  --------------------------------------------------------  IN: 0 mL / OUT: 350 mL / NET: -350 mL      Allergies    No Known Allergies    Intolerances      HEALTH ISSUES - PROBLEM Dx:  Hypertension, unspecified type: Hypertension, unspecified type  Hematoma of arm, right, initial encounter: Hematoma of arm, right, initial encounter  Cough due to ACE inhibitor: Cough due to ACE inhibitor  Cough: Cough  DVT (deep venous thrombosis): DVT (deep venous thrombosis)  Cor pulmonale, acute: Cor pulmonale, acute  Suspected deep vein thrombosis (DVT)  Pulmonary thromboembolism  Acute saddle pulmonary embolism with acute cor pulmonale: Acute saddle pulmonary embolism with acute cor pulmonale        PAST MEDICAL & SURGICAL HISTORY:  Benign prostate hyperplasia  Hyperlipidemia  Hypertension          Vital Signs Last 24 Hrs  T(C): 36.8 (04 Oct 2019 11:00), Max: 36.9 (03 Oct 2019 15:52)  T(F): 98.2 (04 Oct 2019 11:00), Max: 98.4 (03 Oct 2019 15:52)  HR: 83 (04 Oct 2019 11:00) (83 - 92)  BP: 133/81 (04 Oct 2019 11:00) (122/60 - 146/81)  BP(mean): --  RR: 18 (04 Oct 2019 11:00) (18 - 18)  SpO2: 98% (04 Oct 2019 11:00) (95% - 99%)T(C): 36.8 (10-04-19 @ 11:00), Max: 36.9 (10-03-19 @ 15:52)  HR: 83 (10-04-19 @ 11:00) (83 - 92)  BP: 133/81 (10-04-19 @ 11:00) (122/60 - 146/81)  RR: 18 (10-04-19 @ 11:00) (18 - 18)  SpO2: 98% (10-04-19 @ 11:00) (95% - 99%)  Wt(kg): --    PHYSICAL EXAM:    GENERAL: NAD, well-groomed, well-developed  HEAD:  Atraumatic, Normocephalic  NERVOUS SYSTEM:  Alert & Oriented X3,  Moves upper and lower extremities; CNS-II-XII  CHEST/LUNG: Clear to auscultation bilaterally; No rales, rhonchi, wheezing,   HEART: Regular rate and rhythm; No murmurs,   ABDOMEN: Soft, Nontender, Nondistended; Bowel sounds present  EXTREMITIES:  Peripheral hematoma right arm and back of axilla- stable neurovascualrly  Psychiatry- mood and affect approprite, Insight and judgement intact     ALPRAZolam 0.25 milliGRAM(s) Oral every 8 hours PRN  apixaban 10 milliGRAM(s) Oral every 12 hours  atorvastatin 80 milliGRAM(s) Oral at bedtime  docusate sodium 100 milliGRAM(s) Oral two times a day  dutasteride 0.5 milliGRAM(s) Oral daily  heparin  Injectable 6500 Unit(s) IV Push every 6 hours PRN  heparin  Injectable 3000 Unit(s) IV Push every 6 hours PRN  influenza   Vaccine 0.5 milliLiter(s) IntraMuscular once  lactulose Syrup 15 Gram(s) Oral daily  oxyCODONE    5 mG/acetaminophen 325 mG 1 Tablet(s) Oral every 4 hours PRN  oxyCODONE    5 mG/acetaminophen 325 mG 2 Tablet(s) Oral every 4 hours PRN  pantoprazole    Tablet 40 milliGRAM(s) Oral before breakfast  polyethylene glycol 3350 17 Gram(s) Oral daily  senna 2 Tablet(s) Oral at bedtime  simethicone 80 milliGRAM(s) Chew every 8 hours PRN  tamsulosin 0.4 milliGRAM(s) Oral at bedtime      LABS:                          12.1   16.33 )-----------( 251      ( 03 Oct 2019 06:47 )             36.4     10-03    138  |  102  |  9.0  ----------------------------<  128<H>  4.3   |  25.0  |  0.79    Ca    8.8      03 Oct 2019 06:47  Phos  3.7     10-03  Mg     2.2     10-03        PTT - ( 04 Oct 2019 06:00 )  PTT:28.1 sec        CAPILLARY BLOOD GLUCOSE          RADIOLOGY & ADDITIONAL TESTS:      Consultant notes reviewed    Case discussed with consultant/provider/ family /patient

## 2019-10-04 NOTE — PROGRESS NOTE ADULT - ASSESSMENT
67 yo M who initially presented to [facility] ED on 9/29/19 after a near syncopal episode and was found to have Pulmonary embolism with R heart strain and received full dose tPA. PMH HLD, HTN, BPH and GERD.   large hematoma-pulses intact neuro intact   Massive pulmonary embolism with R heart strain:   -s/p tPA.   -started Eliquis -no new bleeding  DC heparin- okayed by cardio .   -patient hemodynamically stable.   on telemetry bed.   -repeat TTE reviewed.     HTN: had an episode of orthostatic hypotension earlier today.   -continue to monitor blood pressure.   -consider restarting Lisinopril if no further episodes of hypotension. Will continue to hold at this time.     HLD: Restart his statin. Patient normally on Crestor 20mg daily, but will substitute Lipitor.     BPH: continue Avodart and Flomax     Leukocytosis: no acute infectious etiology suspected. Continue to trend. Likely reactive.    Constipation- added lactulose once

## 2019-10-04 NOTE — PROGRESS NOTE ADULT - ASSESSMENT
67 y/o M with PMH HLD, HTN, BPH and GERD, admitted to [facility] with acute saddle pulmonary embolism with cor pulmonale, s/p TPA. Hospital course complicated by development of RUE hematoma. Patient is hemodynamically stable. No events on telemetry, sats on room air > 96%.  Started PO Eliquis with initial dose of 10mg twice daily x 7 days, followed by 5mg twice daily.    Patient complaints of mild to moderate pain in right upper extremity, not radiating, relieved with rest, recommending treatement with Toradol if patient tolerates in addition to percocet.   Seen by Vascular surgery  and no acute vascular surgical interventions recommended at this time.          Problem/Plan - 1:  ·  Problem: Acute saddle pulmonary embolism with acute cor pulmonale.  Plan: complicated by bleeding event in the arm. Now stable.  Ct Eliquis per PE protocol  Toradol for pain  Outpatient follow up 2 weeks past discharge with MD Ballard. bleeding, chest pain, fatigue, dizziness-->return to ER

## 2019-10-05 ENCOUNTER — TRANSCRIPTION ENCOUNTER (OUTPATIENT)
Age: 69
End: 2019-10-05

## 2019-10-05 VITALS
SYSTOLIC BLOOD PRESSURE: 119 MMHG | TEMPERATURE: 98 F | RESPIRATION RATE: 18 BRPM | HEART RATE: 84 BPM | DIASTOLIC BLOOD PRESSURE: 72 MMHG | OXYGEN SATURATION: 96 %

## 2019-10-05 LAB
HCT VFR BLD CALC: 37.4 % — LOW (ref 39–50)
HGB BLD-MCNC: 12.2 G/DL — LOW (ref 13–17)
MCHC RBC-ENTMCNC: 30.4 PG — SIGNIFICANT CHANGE UP (ref 27–34)
MCHC RBC-ENTMCNC: 32.6 GM/DL — SIGNIFICANT CHANGE UP (ref 32–36)
MCV RBC AUTO: 93.3 FL — SIGNIFICANT CHANGE UP (ref 80–100)
PLATELET # BLD AUTO: 313 K/UL — SIGNIFICANT CHANGE UP (ref 150–400)
RBC # BLD: 4.01 M/UL — LOW (ref 4.2–5.8)
RBC # FLD: 13.2 % — SIGNIFICANT CHANGE UP (ref 10.3–14.5)
WBC # BLD: 11.26 K/UL — HIGH (ref 3.8–10.5)
WBC # FLD AUTO: 11.26 K/UL — HIGH (ref 3.8–10.5)

## 2019-10-05 PROCEDURE — 80048 BASIC METABOLIC PNL TOTAL CA: CPT

## 2019-10-05 PROCEDURE — 86900 BLOOD TYPING SEROLOGIC ABO: CPT

## 2019-10-05 PROCEDURE — 74177 CT ABD & PELVIS W/CONTRAST: CPT

## 2019-10-05 PROCEDURE — 93931 UPPER EXTREMITY STUDY: CPT

## 2019-10-05 PROCEDURE — 83880 ASSAY OF NATRIURETIC PEPTIDE: CPT

## 2019-10-05 PROCEDURE — 93970 EXTREMITY STUDY: CPT

## 2019-10-05 PROCEDURE — 93308 TTE F-UP OR LMTD: CPT

## 2019-10-05 PROCEDURE — 84100 ASSAY OF PHOSPHORUS: CPT

## 2019-10-05 PROCEDURE — 96374 THER/PROPH/DIAG INJ IV PUSH: CPT | Mod: XU

## 2019-10-05 PROCEDURE — 84484 ASSAY OF TROPONIN QUANT: CPT

## 2019-10-05 PROCEDURE — 85730 THROMBOPLASTIN TIME PARTIAL: CPT

## 2019-10-05 PROCEDURE — 80053 COMPREHEN METABOLIC PANEL: CPT

## 2019-10-05 PROCEDURE — 73206 CT ANGIO UPR EXTRM W/O&W/DYE: CPT

## 2019-10-05 PROCEDURE — 36415 COLL VENOUS BLD VENIPUNCTURE: CPT

## 2019-10-05 PROCEDURE — 86850 RBC ANTIBODY SCREEN: CPT

## 2019-10-05 PROCEDURE — 83735 ASSAY OF MAGNESIUM: CPT

## 2019-10-05 PROCEDURE — 99239 HOSP IP/OBS DSCHRG MGMT >30: CPT

## 2019-10-05 PROCEDURE — 86901 BLOOD TYPING SEROLOGIC RH(D): CPT

## 2019-10-05 PROCEDURE — 93005 ELECTROCARDIOGRAM TRACING: CPT

## 2019-10-05 PROCEDURE — 93306 TTE W/DOPPLER COMPLETE: CPT

## 2019-10-05 PROCEDURE — 70450 CT HEAD/BRAIN W/O DYE: CPT

## 2019-10-05 PROCEDURE — 85610 PROTHROMBIN TIME: CPT

## 2019-10-05 PROCEDURE — 71045 X-RAY EXAM CHEST 1 VIEW: CPT

## 2019-10-05 PROCEDURE — 71275 CT ANGIOGRAPHY CHEST: CPT

## 2019-10-05 PROCEDURE — 93971 EXTREMITY STUDY: CPT

## 2019-10-05 PROCEDURE — 85027 COMPLETE CBC AUTOMATED: CPT

## 2019-10-05 PROCEDURE — 86803 HEPATITIS C AB TEST: CPT

## 2019-10-05 PROCEDURE — 99285 EMERGENCY DEPT VISIT HI MDM: CPT | Mod: 25

## 2019-10-05 RX ORDER — LISINOPRIL 2.5 MG/1
1 TABLET ORAL
Qty: 0 | Refills: 0 | DISCHARGE

## 2019-10-05 RX ORDER — APIXABAN 2.5 MG/1
2 TABLET, FILM COATED ORAL
Qty: 0 | Refills: 0 | DISCHARGE
Start: 2019-10-05

## 2019-10-05 RX ORDER — ASPIRIN/CALCIUM CARB/MAGNESIUM 324 MG
1 TABLET ORAL
Qty: 0 | Refills: 0 | DISCHARGE

## 2019-10-05 RX ORDER — APIXABAN 2.5 MG/1
1 TABLET, FILM COATED ORAL
Qty: 60 | Refills: 0
Start: 2019-10-05 | End: 2019-11-03

## 2019-10-05 RX ORDER — LISINOPRIL 2.5 MG/1
0.5 TABLET ORAL
Qty: 0 | Refills: 0 | DISCHARGE

## 2019-10-05 RX ORDER — APIXABAN 2.5 MG/1
2 TABLET, FILM COATED ORAL
Qty: 24 | Refills: 0
Start: 2019-10-05 | End: 2019-10-10

## 2019-10-05 RX ADMIN — OXYCODONE AND ACETAMINOPHEN 1 TABLET(S): 5; 325 TABLET ORAL at 05:18

## 2019-10-05 RX ADMIN — APIXABAN 10 MILLIGRAM(S): 2.5 TABLET, FILM COATED ORAL at 05:17

## 2019-10-05 RX ADMIN — OXYCODONE AND ACETAMINOPHEN 1 TABLET(S): 5; 325 TABLET ORAL at 07:09

## 2019-10-05 RX ADMIN — OXYCODONE AND ACETAMINOPHEN 1 TABLET(S): 5; 325 TABLET ORAL at 10:59

## 2019-10-05 RX ADMIN — Medication 100 MILLIGRAM(S): at 05:17

## 2019-10-05 RX ADMIN — PANTOPRAZOLE SODIUM 40 MILLIGRAM(S): 20 TABLET, DELAYED RELEASE ORAL at 05:17

## 2019-10-05 RX ADMIN — OXYCODONE AND ACETAMINOPHEN 1 TABLET(S): 5; 325 TABLET ORAL at 09:59

## 2019-10-05 NOTE — DISCHARGE NOTE NURSING/CASE MANAGEMENT/SOCIAL WORK - PATIENT PORTAL LINK FT
You can access the FollowMyHealth Patient Portal offered by [facility] by registering at the following website: [email or web address]. By joining [employer]’s FollowMyHealth portal, you will also be able to view your health information using other applications (apps) compatible with our system.

## 2019-10-05 NOTE — DISCHARGE NOTE PROVIDER - HOSPITAL COURSE
68M with PMHx of HTN, HLD, BPH who presented from home BIBEMS with near-syncope and severe dyspnea. Pt states he has not been feeling well for a few days. Pt seen at [facility] earlier in week with c/o of groin pain. States he was told he had an inguinal hernia which they reduced with some improvement in pain, although he admits to a cough and some SOB for past week or so. Further questioning of pt, he admits to multiple flights over past few months with most recent flight approximately 10 days prior,. Yesterday with acutely worsening SOB, diaphoretic, near syncope where he almost blacked out while attempting to start his grill. Following event wife called EMS and pt was brought to ED. Upon presentation to ED found to hypoxemic and borderline hypotensive with SBP in 90's, and tachycardic in 120's.  CTA performed and revealed a large saddle PE with right heart strain and extensive b/l emboli. Pt given TPA and started on heparin drip, placed on 2L N/C and brought to icu.         patient then transtiopined to po eliquis. patient then developed bleeding in right arm and duplex performed with no issues. patient is now cleared for dc with po eliquis        tte showed some hypokinesis of right ventricle. follow up with cardio as outpatient        time spent ond c32 minutes

## 2019-10-05 NOTE — DISCHARGE NOTE PROVIDER - CARE PROVIDER_API CALL
Edmar Ballard)  Cardiovascular Disease; Interventional Cardiology  [address], [address]  [place] [address]  Phone: [phone]  Fax: [phone]  Follow Up Time:     primary care,   pcp  Phone: (   )    -  Fax: (   )    -  Follow Up Time: Edmar Ballard)  Cardiovascular Disease; Interventional Cardiology  [address], [address]  [place] [address]  Phone: [phone]  Fax: [phone]  Follow Up Time:     primary care,   pcp  Phone: (   )    -  Fax: (   )    -  Follow Up Time:     ManvarSingh, Pallavi B (MD)  Vascular Surgery  [address] [place] [address]  Phone: [phone]  Fax: [phone]  Follow Up Time:     Aleshia Miller)  Hematology; Internal Medicine; Medical Oncology  [address] [place] [address]  Phone: [phone]  Fax: [phone]  Follow Up Time:

## 2019-10-05 NOTE — DISCHARGE NOTE PROVIDER - CARE PROVIDERS DIRECT ADDRESSES
,[email or web address],DirectAddress_Unknown ,[email or web address],DirectAddress_Unknown,[email or web address],DirectAddress_Unknown

## 2019-10-05 NOTE — DISCHARGE NOTE PROVIDER - PROVIDER TOKENS
PROVIDER:[TOKEN:[[id number]]],FREE:[LAST:[primary care],PHONE:[(   )    -],FAX:[(   )    -],ADDRESS:[pcp]] PROVIDER:[TOKEN:[[id number]]],FREE:[LAST:[primary care],PHONE:[(   )    -],FAX:[(   )    -],ADDRESS:[pcp]],PROVIDER:[TOKEN:[[id number]]],PROVIDER:[TOKEN:[[id number]]]

## 2019-10-05 NOTE — DISCHARGE NOTE PROVIDER - NSDCCPCAREPLAN_GEN_ALL_CORE_FT
PRINCIPAL DISCHARGE DIAGNOSIS  Diagnosis: Saddle embolus of pulmonary artery with acute cor pulmonale  Assessment and Plan of Treatment:

## 2019-10-07 PROBLEM — N40.0 BENIGN PROSTATIC HYPERPLASIA WITHOUT LOWER URINARY TRACT SYMPTOMS: Chronic | Status: ACTIVE | Noted: 2019-09-30

## 2019-10-07 PROBLEM — E78.5 HYPERLIPIDEMIA, UNSPECIFIED: Chronic | Status: ACTIVE | Noted: 2019-09-30

## 2019-10-07 PROBLEM — I10 ESSENTIAL (PRIMARY) HYPERTENSION: Chronic | Status: ACTIVE | Noted: 2019-09-29

## 2019-10-10 ENCOUNTER — APPOINTMENT (OUTPATIENT)
Dept: CARDIOLOGY | Facility: CLINIC | Age: 69
End: 2019-10-10
Payer: MEDICARE

## 2019-10-10 ENCOUNTER — NON-APPOINTMENT (OUTPATIENT)
Age: 69
End: 2019-10-10

## 2019-10-10 ENCOUNTER — MEDICATION RENEWAL (OUTPATIENT)
Age: 69
End: 2019-10-10

## 2019-10-10 VITALS
DIASTOLIC BLOOD PRESSURE: 70 MMHG | SYSTOLIC BLOOD PRESSURE: 122 MMHG | HEART RATE: 84 BPM | RESPIRATION RATE: 16 BRPM | OXYGEN SATURATION: 99 %

## 2019-10-10 VITALS — HEIGHT: 70 IN | BODY MASS INDEX: 0.8 KG/M2 | WEIGHT: 5.63 LBS

## 2019-10-10 DIAGNOSIS — Z87.898 PERSONAL HISTORY OF OTHER SPECIFIED CONDITIONS: ICD-10-CM

## 2019-10-10 DIAGNOSIS — Z82.3 FAMILY HISTORY OF STROKE: ICD-10-CM

## 2019-10-10 PROCEDURE — 99215 OFFICE O/P EST HI 40 MIN: CPT

## 2019-10-10 PROCEDURE — 93000 ELECTROCARDIOGRAM COMPLETE: CPT

## 2019-10-11 ENCOUNTER — APPOINTMENT (OUTPATIENT)
Dept: VASCULAR SURGERY | Facility: CLINIC | Age: 69
End: 2019-10-11
Payer: MEDICARE

## 2019-10-11 VITALS
TEMPERATURE: 97.5 F | WEIGHT: 197 LBS | OXYGEN SATURATION: 100 % | BODY MASS INDEX: 28.2 KG/M2 | DIASTOLIC BLOOD PRESSURE: 82 MMHG | SYSTOLIC BLOOD PRESSURE: 138 MMHG | HEART RATE: 71 BPM | HEIGHT: 70 IN

## 2019-10-11 DIAGNOSIS — Z82.49 FAMILY HISTORY OF ISCHEMIC HEART DISEASE AND OTHER DISEASES OF THE CIRCULATORY SYSTEM: ICD-10-CM

## 2019-10-11 DIAGNOSIS — Z86.39 PERSONAL HISTORY OF OTHER ENDOCRINE, NUTRITIONAL AND METABOLIC DISEASE: ICD-10-CM

## 2019-10-11 DIAGNOSIS — S40.029A CONTUSION OF UNSPECIFIED UPPER ARM, INITIAL ENCOUNTER: ICD-10-CM

## 2019-10-11 DIAGNOSIS — Z87.39 PERSONAL HISTORY OF OTHER DISEASES OF THE MUSCULOSKELETAL SYSTEM AND CONNECTIVE TISSUE: ICD-10-CM

## 2019-10-11 DIAGNOSIS — Z82.61 FAMILY HISTORY OF ARTHRITIS: ICD-10-CM

## 2019-10-11 DIAGNOSIS — Z86.711 PERSONAL HISTORY OF PULMONARY EMBOLISM: ICD-10-CM

## 2019-10-11 PROCEDURE — 99214 OFFICE O/P EST MOD 30 MIN: CPT

## 2019-10-11 PROCEDURE — 93931 UPPER EXTREMITY STUDY: CPT

## 2019-10-22 DIAGNOSIS — Z98.890 OTHER SPECIFIED POSTPROCEDURAL STATES: ICD-10-CM

## 2019-11-26 ENCOUNTER — APPOINTMENT (OUTPATIENT)
Dept: CARDIOLOGY | Facility: CLINIC | Age: 69
End: 2019-11-26
Payer: MEDICARE

## 2019-11-26 ENCOUNTER — APPOINTMENT (OUTPATIENT)
Dept: PULMONOLOGY | Facility: CLINIC | Age: 69
End: 2019-11-26
Payer: MEDICARE

## 2019-11-26 VITALS
HEART RATE: 65 BPM | DIASTOLIC BLOOD PRESSURE: 70 MMHG | OXYGEN SATURATION: 98 % | BODY MASS INDEX: 29.03 KG/M2 | WEIGHT: 196 LBS | SYSTOLIC BLOOD PRESSURE: 126 MMHG | HEIGHT: 69 IN

## 2019-11-26 PROCEDURE — 93970 EXTREMITY STUDY: CPT

## 2019-11-26 PROCEDURE — 99215 OFFICE O/P EST HI 40 MIN: CPT

## 2019-11-26 PROCEDURE — 93306 TTE W/DOPPLER COMPLETE: CPT

## 2019-11-26 RX ORDER — OMEPRAZOLE 40 MG/1
40 CAPSULE, DELAYED RELEASE ORAL
Refills: 0 | Status: DISCONTINUED | COMMUNITY
End: 2019-11-26

## 2019-11-26 RX ORDER — TRAZODONE HYDROCHLORIDE 50 MG/1
50 TABLET ORAL
Refills: 0 | Status: DISCONTINUED | COMMUNITY
End: 2019-11-26

## 2019-11-26 RX ORDER — LISINOPRIL 20 MG/1
20 TABLET ORAL DAILY
Qty: 90 | Refills: 0 | Status: DISCONTINUED | COMMUNITY
End: 2019-11-26

## 2019-11-26 RX ORDER — APIXABAN 5 MG/1
5 TABLET, FILM COATED ORAL
Refills: 0 | Status: DISCONTINUED | COMMUNITY
End: 2019-11-26

## 2019-11-26 RX ORDER — TRAZODONE HYDROCHLORIDE 300 MG/1
TABLET ORAL DAILY
Refills: 0 | Status: DISCONTINUED | COMMUNITY
End: 2019-11-26

## 2019-11-26 RX ORDER — TAMSULOSIN HYDROCHLORIDE 0.4 MG/1
0.4 CAPSULE ORAL
Refills: 0 | Status: DISCONTINUED | COMMUNITY
End: 2019-11-26

## 2019-11-26 RX ORDER — ROSUVASTATIN CALCIUM 20 MG/1
20 TABLET, FILM COATED ORAL
Refills: 0 | Status: DISCONTINUED | COMMUNITY
End: 2019-11-26

## 2019-11-26 RX ORDER — DUTASTERIDE 0.5 MG/1
CAPSULE, LIQUID FILLED ORAL
Refills: 0 | Status: DISCONTINUED | COMMUNITY
End: 2019-11-26

## 2019-11-26 NOTE — HISTORY OF PRESENT ILLNESS
[FreeTextEntry1] : no chest pain or dyspnea\par saw Hematologist in [facility] and Cardiologist\par on Eliquis\par had repeat echo and duplex today\par R arm strength improving

## 2019-11-26 NOTE — DISCUSSION/SUMMARY
[FreeTextEntry1] : Submassive PE, saddle embolus with RV strain, acute cor pulmonale with DVT, lots of short flights/travel but essentially unprovoked for any hard risk factors, no prior clots ( age 69)\par post systemic TPA, complicated by R arm hematoma/neurological sequelae\par now appears at baseline, arm strength improving\par no acute pulmonary complaints, no bleeding issues\par saw Hematologist, work up pending, CT abdomen without any evidence of malignancy, he follows with GI, discussed PSA\par Repeat echocardiogram and duplex pending\par Needs eventual repeat CTA\par follow up 3 months or sooner if needed\par

## 2019-11-26 NOTE — CONSULT LETTER
[Dear  ___] : Dear  [unfilled], [Please see my note below.] : Please see my note below. [Consult Letter:] : I had the pleasure of evaluating your patient, [unfilled]. [FreeTextEntry3] : Tien Finn DO [facility]\par Pulmonary Critical Care\par Director Pulmonary Division\par Medical Director Respiratory Therapy\par [facility]\par \par  [Sincerely,] : Sincerely,

## 2019-11-27 ENCOUNTER — TRANSCRIPTION ENCOUNTER (OUTPATIENT)
Age: 69
End: 2019-11-27

## 2019-12-05 ENCOUNTER — APPOINTMENT (OUTPATIENT)
Dept: CARDIOLOGY | Facility: CLINIC | Age: 69
End: 2019-12-05
Payer: MEDICARE

## 2019-12-05 VITALS
BODY MASS INDEX: 29.18 KG/M2 | OXYGEN SATURATION: 95 % | HEIGHT: 69 IN | WEIGHT: 197 LBS | HEART RATE: 78 BPM | SYSTOLIC BLOOD PRESSURE: 129 MMHG | DIASTOLIC BLOOD PRESSURE: 82 MMHG

## 2019-12-05 PROCEDURE — 99214 OFFICE O/P EST MOD 30 MIN: CPT

## 2020-01-27 ENCOUNTER — FORM ENCOUNTER (OUTPATIENT)
Age: 70
End: 2020-01-27

## 2020-01-28 ENCOUNTER — OUTPATIENT (OUTPATIENT)
Dept: OUTPATIENT SERVICES | Facility: HOSPITAL | Age: 70
LOS: 1 days | End: 2020-01-28
Payer: MEDICARE

## 2020-01-28 ENCOUNTER — APPOINTMENT (OUTPATIENT)
Dept: CT IMAGING | Facility: CLINIC | Age: 70
End: 2020-01-28
Payer: MEDICARE

## 2020-01-28 DIAGNOSIS — I26.02 SADDLE EMBOLUS OF PULMONARY ARTERY WITH ACUTE COR PULMONALE: ICD-10-CM

## 2020-01-28 PROCEDURE — 71275 CT ANGIOGRAPHY CHEST: CPT

## 2020-01-28 PROCEDURE — 71275 CT ANGIOGRAPHY CHEST: CPT | Mod: 26

## 2020-02-03 ENCOUNTER — TRANSCRIPTION ENCOUNTER (OUTPATIENT)
Age: 70
End: 2020-02-03

## 2020-02-11 ENCOUNTER — APPOINTMENT (OUTPATIENT)
Dept: PULMONOLOGY | Facility: CLINIC | Age: 70
End: 2020-02-11
Payer: MEDICARE

## 2020-02-11 VITALS
BODY MASS INDEX: 29.33 KG/M2 | HEIGHT: 68.75 IN | OXYGEN SATURATION: 96 % | HEART RATE: 76 BPM | SYSTOLIC BLOOD PRESSURE: 126 MMHG | DIASTOLIC BLOOD PRESSURE: 80 MMHG | WEIGHT: 198 LBS

## 2020-02-11 PROCEDURE — 99215 OFFICE O/P EST HI 40 MIN: CPT

## 2020-02-11 NOTE — PHYSICAL EXAM
[General Appearance - Well Developed] : well developed [Normal Appearance] : normal appearance [General Appearance - Well Nourished] : well nourished [Normal Conjunctiva] : the conjunctiva exhibited no abnormalities [Normal Oropharynx] : normal oropharynx [Neck Appearance] : the appearance of the neck was normal [Heart Rate And Rhythm] : heart rate and rhythm were normal [Heart Sounds] : normal S1 and S2 [Murmurs] : no murmurs present [Edema] : no peripheral edema present [Respiration, Rhythm And Depth] : normal respiratory rhythm and effort [Exaggerated Use Of Accessory Muscles For Inspiration] : no accessory muscle use [Auscultation Breath Sounds / Voice Sounds] : lungs were clear to auscultation bilaterally [Lungs Percussion] : the lungs were normal to percussion [Abnormal Walk] : normal gait [Nail Clubbing] : no clubbing of the fingernails [Cyanosis, Localized] : no localized cyanosis [] : no rash [No Focal Deficits] : no focal deficits [Impaired Insight] : insight and judgment were intact [Oriented To Time, Place, And Person] : oriented to person, place, and time [Affect] : the affect was normal [Memory Recent] : recent memory was not impaired [FreeTextEntry1] : no chest wall abn

## 2020-02-11 NOTE — CONSULT LETTER
[Dear  ___] : Dear  [unfilled], [Consult Letter:] : I had the pleasure of evaluating your patient, [unfilled]. [Please see my note below.] : Please see my note below. [Sincerely,] : Sincerely, [FreeTextEntry3] : Tien Finn DO [facility]\par Pulmonary Critical Care\par Director Pulmonary Division\par Medical Director Respiratory Therapy\par [facility]\par \par

## 2020-02-11 NOTE — DISCUSSION/SUMMARY
[FreeTextEntry1] : Submassive PE, saddle embolus with RV strain, acute cor pulmonale with DVT, lots of short flights/travel but essentially unprovoked for any hard risk factors, no prior clots ( age 69)\par post systemic TPA, complicated by R arm hematoma/neurological sequelae\par now appears at baseline, arm strength improving\par no acute pulmonary complaints, no bleeding issues\par saw Hematologist, work up negative except for  anticardiolipin ab, CT abdomen without any evidence of malignancy, PSA normal, recent colonoscopy negative\par Repeat echocardiogram with normal RV, CTA with resolved PE\par Discussed long term mgmt, which include indefinite full dose, 1/2 dose NOAC, or Asa aloane\par await vascular follow up for popliteal DVT \par follow up 3 months or sooner if needed\par

## 2020-02-11 NOTE — HISTORY OF PRESENT ILLNESS
[TextBox_4] : no chest pain or dyspnea\par saw Hematologist in [facility] and Cardiologist\par on Eliquis\par repeat echocardiogram with normal RV, no sig pulmonary htn\par saw hematology , work up negative to date\par R arm strength improving\par no fever, chill, chest pain, no sig bleeding

## 2020-03-16 ENCOUNTER — APPOINTMENT (OUTPATIENT)
Dept: FAMILY MEDICINE | Facility: CLINIC | Age: 70
End: 2020-03-16

## 2020-03-24 ENCOUNTER — APPOINTMENT (OUTPATIENT)
Dept: CARDIOLOGY | Facility: CLINIC | Age: 70
End: 2020-03-24

## 2020-04-10 ENCOUNTER — APPOINTMENT (OUTPATIENT)
Dept: VASCULAR SURGERY | Facility: CLINIC | Age: 70
End: 2020-04-10

## 2020-04-21 ENCOUNTER — APPOINTMENT (OUTPATIENT)
Dept: UROLOGY | Facility: CLINIC | Age: 70
End: 2020-04-21

## 2020-04-28 ENCOUNTER — APPOINTMENT (OUTPATIENT)
Dept: UROLOGY | Facility: CLINIC | Age: 70
End: 2020-04-28
Payer: MEDICARE

## 2020-04-28 PROCEDURE — 99214 OFFICE O/P EST MOD 30 MIN: CPT | Mod: 95

## 2020-04-28 NOTE — PHYSICAL EXAM
[General Appearance - Well Developed] : well developed [General Appearance - Well Nourished] : well nourished [Well Groomed] : well groomed [Normal Appearance] : normal appearance [General Appearance - In No Acute Distress] : no acute distress [Oriented To Time, Place, And Person] : oriented to person, place, and time [Affect] : the affect was normal [Mood] : the mood was normal [Not Anxious] : not anxious

## 2020-05-01 ENCOUNTER — TRANSCRIPTION ENCOUNTER (OUTPATIENT)
Age: 70
End: 2020-05-01

## 2020-05-01 NOTE — HISTORY OF PRESENT ILLNESS
[Medical Office: ([facility])___] : at the medical office located in  [Patient] : the patient [Home] : at home, [unfilled] , at the time of the visit. [FreeTextEntry2] : CALE MCALLISTER [FreeTextEntry1] : Patient is a 70yo M who presents for f/u with BPH, mildly elevated PSA of 4.4.  Pt is a former pt of Dr Chavis's, however due to insurance is now transferring care to me. He has a longstanding h/o BPH and elevated PSA.  He had been on alpha blockers before but did not feel they were helpful and stopped Rapaflo due to ejaculatory side effects.  He was very bothered by ejaculatory side effects from Rapaflo, however he feels that many yrs when he tried flomax he did not have as significant ejaculatory side effects.. From Dr Chavis, His prostate is @90cc in volume and in 2015 PSA 4.4 with 18% free. \par \par Pt was previously was other regimens for BPH -  rapaflo and finasteride, but experienced increased pain in perineum/pelvis, particularly with ejaculation on rapaflo and finasteride.  Did not have this on flomax bid/avodart.  But had dizziness on bid flomax.\par \par He then switched to flomax once daily and avodart. \par \par PSA in 7/2017 is 1.2 (on dutasteride).\par PSA in 7/2018 is 1.1 (on dutasteride)\par PSA in 8/2019 is 1.13 (on dutasteride)\par \par Since last visit, he has noted that his L breast is more tender/uncomfortable.  He stopped his dutasteride ~ 3wks ago, now reports L breast tenderness is resolved.  No breast mass or enlargement. He reports stable LUTS off dutasteride and just on flomax daily.  Reports  - good stream except first void, good emptying. Improved frequency.  Nocturia x1-2.  No dysuria or hematuria.\par \par Mild ED - reports less rigid erections, but he can maintain at times to completion.  No use of ED meds.  His libido is not as strong as it used to be, but mainly his erections are not as firm.  He does orgasm and issue more is penetration.  No nitrates, no CP or SOB.  He was seen by a Cardiologist - had normal stress test in the past, however last year in the fall 2019 had PE.  Repeat Echo showed normal EF and RV.   He also no longer is taking trazadone.  He is now interested ini pursuing viagra or cialis.

## 2020-05-01 NOTE — ADDENDUM
[FreeTextEntry1] : Pt reports insurance will not cover cialis, rx for viagra sent. SE profile, instructions again reviewed

## 2020-05-01 NOTE — ASSESSMENT
[FreeTextEntry1] : Patient is a 70 yo M with BPH/LUTS.  Also with ED.\par \par He has stopped taking dutasteride due to SE of breast tenderness.\par Remains on flomax. \par Interested in ED medications.\par Given his BPH and ED, discussed with pt cialis vs viagra.  Feel cialis may be of more benefit as FDA approved for BPH as well as ED.\par Will give trial of oral PDE-is for his ED.  Discussed with pt proper use and side effect profile, including risk of priapism.  D/w pt that cialis can be taken without worrying about timing with flomax, whereas viagra must be  by min 4 hrs. from flomax.\par After discussion he will try cialis.\par Rx sent\par F/u 1-2 mos

## 2020-05-11 ENCOUNTER — APPOINTMENT (OUTPATIENT)
Dept: FAMILY MEDICINE | Facility: CLINIC | Age: 70
End: 2020-05-11
Payer: MEDICARE

## 2020-05-11 DIAGNOSIS — Z76.89 PERSONS ENCOUNTERING HEALTH SERVICES IN OTHER SPECIFIED CIRCUMSTANCES: ICD-10-CM

## 2020-05-11 PROCEDURE — 99204 OFFICE O/P NEW MOD 45 MIN: CPT | Mod: 95

## 2020-05-11 NOTE — ASSESSMENT
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

## 2020-05-11 NOTE — HISTORY OF PRESENT ILLNESS
[Home] : at home, [unfilled] , at the time of the visit. [Medical Office: ([facility])___] : at the medical office located in  [Patient] : the patient [FreeTextEntry1] : Establish a new PCP [de-identified] : Previous PCP at [facility].\par Pt is retired teacher for 12 years ().\par Pt with hx PE in 9/29/19 on Eliquis.\par Neuropathy on Lyrica taper down with Neurology.\par BPH on tamsulosin, Dyslipidemia on crestor, GERD on omeprazole.\par Reports family issues recently and /82; 137/79; 130/84; 140/83; 150/83; 152//69.\par

## 2020-05-11 NOTE — HEALTH RISK ASSESSMENT
[Good] : ~his/her~ current health as good [Fair] :  ~his/her~ mood as fair [Yes] : Yes [2 - 4 times a month (2 pts)] : 2-4 times a month (2 points) [1 or 2 (0 pts)] : 1 or 2 (0 points) [No] : In the past 12 months have you used drugs other than those required for medical reasons? No [No falls in past year] : Patient reported no falls in the past year [0] : 2) Feeling down, depressed, or hopeless: Not at all (0) [Patient reported colonoscopy was abnormal] : Patient reported colonoscopy was abnormal [HIV test declined] : HIV test declined [Hepatitis C test declined] : Hepatitis C test declined [None] : None [Retired] : retired [With Significant Other] : lives with significant other [# Of Children ___] : has [unfilled] children [] :  [Feels Safe at Home] : Feels safe at home [Fully functional (bathing, dressing, toileting, transferring, walking, feeding)] : Fully functional (bathing, dressing, toileting, transferring, walking, feeding) [Fully functional (using the telephone, shopping, preparing meals, housekeeping, doing laundry, using] : Fully functional and needs no help or supervision to perform IADLs (using the telephone, shopping, preparing meals, housekeeping, doing laundry, using transportation, managing medications and managing finances) [Smoke Detector] : smoke detector [Seat Belt] :  uses seat belt [Safety elements used in home] : safety elements used in home [With Patient/Caregiver] : With Patient/Caregiver [Designated Healthcare Proxy] : Designated healthcare proxy [Relationship: ___] : Relationship: [unfilled] [Name: ___] : Health Care Proxy's Name: [unfilled]  [] : No [de-identified] : quit at 26 y/o [de-identified] : daily until last week. Now weekly [de-identified] : arlyn in [facility] [de-identified] : walking and jogging daily [de-identified] : healthy [FreeTextEntry1] : periods of sadness/ Enjoy life [Change in mental status noted] : No change in mental status noted [Language] : denies difficulty with language [Handling Complex Tasks] : denies difficulty handling complex tasks [Sexually Active] : not sexually active [Reports changes in hearing] : Reports no changes in hearing [Reports changes in vision] : Reports no changes in vision [Reports changes in dental health] : Reports no changes in dental health [TB Exposure] : is not being exposed to tuberculosis [ColonoscopyDate] : 2018 [ColonoscopyComments] : Polypectomy with GI: Dr Griffith at [facility] [FreeTextEntry2] : 1/ week tours at [facility] [FreeTextEntry3] : 4 grandchildrens [AdvancecareDate] : 5/11/20

## 2020-05-11 NOTE — PHYSICAL EXAM
[No Acute Distress] : no acute distress [Well Nourished] : well nourished [Well Developed] : well developed [Well-Appearing] : well-appearing [Normal] : no acute distress, well nourished, well developed and well-appearing

## 2020-05-12 ENCOUNTER — TRANSCRIPTION ENCOUNTER (OUTPATIENT)
Age: 70
End: 2020-05-12

## 2020-06-15 ENCOUNTER — APPOINTMENT (OUTPATIENT)
Dept: CARDIOLOGY | Facility: CLINIC | Age: 70
End: 2020-06-15
Payer: MEDICARE

## 2020-06-15 PROCEDURE — 93306 TTE W/DOPPLER COMPLETE: CPT

## 2020-06-15 PROCEDURE — 93971 EXTREMITY STUDY: CPT

## 2020-06-18 LAB
SARS-COV-2 IGG SERPL IA-ACNC: <3.8 AU/ML
SARS-COV-2 IGG SERPL QL IA: NEGATIVE

## 2020-06-22 ENCOUNTER — APPOINTMENT (OUTPATIENT)
Dept: FAMILY MEDICINE | Facility: CLINIC | Age: 70
End: 2020-06-22
Payer: MEDICARE

## 2020-06-22 VITALS
BODY MASS INDEX: 29.33 KG/M2 | HEART RATE: 80 BPM | SYSTOLIC BLOOD PRESSURE: 150 MMHG | TEMPERATURE: 97.6 F | OXYGEN SATURATION: 97 % | DIASTOLIC BLOOD PRESSURE: 95 MMHG | HEIGHT: 69 IN | WEIGHT: 198 LBS

## 2020-06-22 VITALS — DIASTOLIC BLOOD PRESSURE: 90 MMHG | SYSTOLIC BLOOD PRESSURE: 140 MMHG

## 2020-06-22 DIAGNOSIS — N63.21 UNSPECIFIED LUMP IN THE LEFT BREAST, UPPER OUTER QUADRANT: ICD-10-CM

## 2020-06-22 DIAGNOSIS — G62.9 POLYNEUROPATHY, UNSPECIFIED: ICD-10-CM

## 2020-06-22 DIAGNOSIS — N63.20 UNSPECIFIED LUMP IN THE LEFT BREAST, UNSPECIFIED QUADRANT: ICD-10-CM

## 2020-06-22 PROCEDURE — 99214 OFFICE O/P EST MOD 30 MIN: CPT

## 2020-06-22 RX ORDER — TADALAFIL 5 MG/1
5 TABLET ORAL
Qty: 30 | Refills: 3 | Status: DISCONTINUED | COMMUNITY
Start: 2020-04-28 | End: 2020-06-22

## 2020-06-22 NOTE — HISTORY OF PRESENT ILLNESS
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

## 2020-06-22 NOTE — HEALTH RISK ASSESSMENT
[Yes] : Yes [No] : In the past 12 months have you used drugs other than those required for medical reasons? No [No falls in past year] : Patient reported no falls in the past year [] : No [de-identified] : Cardiology, Pulmonology, urology, hematology [de-identified] : weekly [de-identified] : 5-6/week. Golf occasional/ jogg-walks [de-identified] : healthy

## 2020-06-22 NOTE — ASSESSMENT
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

## 2020-06-22 NOTE — PHYSICAL EXAM
[Normal] : normal rate, regular rhythm, normal S1 and S2 and no murmur heard [2:00] : in the 2:00 position [___cm] : a ~M [unfilled] ~Ucm superior medial quadrant mass was palpated

## 2020-06-23 ENCOUNTER — APPOINTMENT (OUTPATIENT)
Dept: CARDIOLOGY | Facility: CLINIC | Age: 70
End: 2020-06-23
Payer: MEDICARE

## 2020-06-23 ENCOUNTER — NON-APPOINTMENT (OUTPATIENT)
Age: 70
End: 2020-06-23

## 2020-06-23 VITALS — SYSTOLIC BLOOD PRESSURE: 132 MMHG | DIASTOLIC BLOOD PRESSURE: 70 MMHG

## 2020-06-23 VITALS
TEMPERATURE: 98.2 F | SYSTOLIC BLOOD PRESSURE: 130 MMHG | OXYGEN SATURATION: 97 % | DIASTOLIC BLOOD PRESSURE: 70 MMHG | WEIGHT: 198 LBS | BODY MASS INDEX: 29.33 KG/M2 | HEART RATE: 64 BPM | HEIGHT: 69 IN

## 2020-06-23 PROCEDURE — 99214 OFFICE O/P EST MOD 30 MIN: CPT

## 2020-06-23 PROCEDURE — 93000 ELECTROCARDIOGRAM COMPLETE: CPT

## 2020-06-26 ENCOUNTER — RESULT REVIEW (OUTPATIENT)
Age: 70
End: 2020-06-26

## 2020-06-26 ENCOUNTER — OUTPATIENT (OUTPATIENT)
Dept: OUTPATIENT SERVICES | Facility: HOSPITAL | Age: 70
LOS: 1 days | End: 2020-06-26
Payer: MEDICARE

## 2020-06-26 ENCOUNTER — APPOINTMENT (OUTPATIENT)
Dept: MAMMOGRAPHY | Facility: CLINIC | Age: 70
End: 2020-06-26
Payer: MEDICARE

## 2020-06-26 ENCOUNTER — APPOINTMENT (OUTPATIENT)
Dept: ULTRASOUND IMAGING | Facility: CLINIC | Age: 70
End: 2020-06-26
Payer: MEDICARE

## 2020-06-26 DIAGNOSIS — N63.21 UNSPECIFIED LUMP IN THE LEFT BREAST, UPPER OUTER QUADRANT: ICD-10-CM

## 2020-06-26 PROCEDURE — G0279: CPT | Mod: 26

## 2020-06-26 PROCEDURE — 76641 ULTRASOUND BREAST COMPLETE: CPT

## 2020-06-26 PROCEDURE — 77066 DX MAMMO INCL CAD BI: CPT

## 2020-06-26 PROCEDURE — 76641 ULTRASOUND BREAST COMPLETE: CPT | Mod: 26,LT

## 2020-06-26 PROCEDURE — 77066 DX MAMMO INCL CAD BI: CPT | Mod: 26

## 2020-06-26 PROCEDURE — G0279: CPT

## 2020-07-10 ENCOUNTER — TRANSCRIPTION ENCOUNTER (OUTPATIENT)
Age: 70
End: 2020-07-10

## 2020-07-13 ENCOUNTER — TRANSCRIPTION ENCOUNTER (OUTPATIENT)
Age: 70
End: 2020-07-13

## 2020-07-16 ENCOUNTER — TRANSCRIPTION ENCOUNTER (OUTPATIENT)
Age: 70
End: 2020-07-16

## 2020-07-16 ENCOUNTER — APPOINTMENT (OUTPATIENT)
Dept: PULMONOLOGY | Facility: CLINIC | Age: 70
End: 2020-07-16
Payer: MEDICARE

## 2020-07-16 VITALS — WEIGHT: 196 LBS | BODY MASS INDEX: 29.03 KG/M2 | HEIGHT: 69 IN

## 2020-07-16 DIAGNOSIS — Z87.891 PERSONAL HISTORY OF NICOTINE DEPENDENCE: ICD-10-CM

## 2020-07-16 PROCEDURE — 99213 OFFICE O/P EST LOW 20 MIN: CPT | Mod: 95

## 2020-07-16 RX ORDER — PREGABALIN 75 MG/1
75 CAPSULE ORAL TWICE DAILY
Refills: 0 | Status: DISCONTINUED | COMMUNITY
Start: 2019-11-26 | End: 2020-07-16

## 2020-07-16 NOTE — DISCUSSION/SUMMARY
[FreeTextEntry1] : Submassive PE 9/19 , saddle embolus with RV strain, acute cor pulmonale with DVT, lots of short flights/travel but essentially unprovoked for any hard risk factors, no prior clots ( age 69)\par post systemic TPA, complicated by R arm hematoma/neurological sequela\par no acute pulmonary complaints, no bleeding issues\par saw Hematologist, work up negative except for  anticardiolipin ab, CT abdomen without any evidence of malignancy, PSA normal, recent colonoscopy negative\par Repeat echocardiogram with normal RV, CTA with resolved PE, duplex improved, residual L popliteal\par Discussed long term mgmt, which include indefinite full dose, 1/2 dose NOAC, or Asa alone\par Favor at least 1/2 dose NOAC\par Cardiology input noted, await hematology input\par follow up 3 months or sooner if needed\par

## 2020-07-16 NOTE — REASON FOR VISIT
[Follow-Up] : a follow-up visit [Pulmonary Embolism] : pulmonary embolism [Home] : at home, [unfilled] , at the time of the visit. [Medical Office: ([facility])___] : at the medical office located in  [Verbal consent obtained from patient] : the patient, [unfilled]

## 2020-07-16 NOTE — PROCEDURE
[FreeTextEntry1] : CTA reviewed, resolved PE 1/20\par echo 6/20: normal RV/LV , no sig pulmonary htn\par duplex L popliteal DVT improved, residual

## 2020-07-16 NOTE — CONSULT LETTER
[Dear  ___] : Dear  [unfilled], [Consult Letter:] : I had the pleasure of evaluating your patient, [unfilled]. [Sincerely,] : Sincerely, [Please see my note below.] : Please see my note below. [FreeTextEntry3] : Tien Finn DO [facility]\par Pulmonary Critical Care\par Director Pulmonary Division\par Medical Director Respiratory Therapy\par [facility]\par \par

## 2020-07-16 NOTE — HISTORY OF PRESENT ILLNESS
[TextBox_4] : no chest pain or dyspnea\par doing well, no acute complaints\par on Eliquis, no bleeding reported\par last  echocardiogram with normal RV, no sig pulmonary htn\par residual popliteal clot L \par saw hematology , work up negative to date, on test pending\par R arm strength close to normal\par no fever, chill, chest pain, \par no sig dyspnea, has occasional episodes when he gets up at night ( baseline)

## 2020-07-16 NOTE — PHYSICAL EXAM
[General Appearance - Well Developed] : well developed [Normal Appearance] : normal appearance [General Appearance - Well Nourished] : well nourished [Normal Conjunctiva] : the conjunctiva exhibited no abnormalities [Normal Oropharynx] : normal oropharynx [Neck Appearance] : the appearance of the neck was normal [Heart Sounds] : normal S1 and S2 [Heart Rate And Rhythm] : heart rate and rhythm were normal [Murmurs] : no murmurs present [Edema] : no peripheral edema present [Exaggerated Use Of Accessory Muscles For Inspiration] : no accessory muscle use [Auscultation Breath Sounds / Voice Sounds] : lungs were clear to auscultation bilaterally [Respiration, Rhythm And Depth] : normal respiratory rhythm and effort [Lungs Percussion] : the lungs were normal to percussion [Abnormal Walk] : normal gait [Cyanosis, Localized] : no localized cyanosis [Nail Clubbing] : no clubbing of the fingernails [] : no rash [No Focal Deficits] : no focal deficits [Oriented To Time, Place, And Person] : oriented to person, place, and time [Impaired Insight] : insight and judgment were intact [Memory Recent] : recent memory was not impaired [Affect] : the affect was normal [FreeTextEntry1] : no chest wall abn

## 2020-08-14 ENCOUNTER — APPOINTMENT (OUTPATIENT)
Dept: UROLOGY | Facility: CLINIC | Age: 70
End: 2020-08-14
Payer: MEDICARE

## 2020-08-14 VITALS — TEMPERATURE: 97.6 F

## 2020-08-14 PROCEDURE — 99213 OFFICE O/P EST LOW 20 MIN: CPT

## 2020-08-14 NOTE — PHYSICAL EXAM
[General Appearance - Well Developed] : well developed [Normal Appearance] : normal appearance [Well Groomed] : well groomed [General Appearance - Well Nourished] : well nourished [General Appearance - In No Acute Distress] : no acute distress [Oriented To Time, Place, And Person] : oriented to person, place, and time [Not Anxious] : not anxious [Affect] : the affect was normal [Mood] : the mood was normal

## 2020-08-17 NOTE — HISTORY OF PRESENT ILLNESS
[FreeTextEntry1] : Patient is a 68yo M who presents for f/u with BPH, mildly elevated PSA of 4.4.  Pt is a former pt of Dr Chavis's, however due to insurance is now transferring care to me. He has a longstanding h/o BPH and elevated PSA.  He had been on alpha blockers before but did not feel they were helpful and stopped Rapaflo due to ejaculatory side effects.  He was very bothered by ejaculatory side effects from Rapaflo, however he feels that many yrs when he tried flomax he did not have as significant ejaculatory side effects.. From Dr Chavis, His prostate is @90cc in volume and in 2015 PSA 4.4 with 18% free. \par \par Pt was previously was other regimens for BPH -  rapaflo and finasteride, but experienced increased pain in perineum/pelvis, particularly with ejaculation on rapaflo and finasteride.  Did not have this on flomax bid/avodart.  But had dizziness on bid flomax.\par \par He then switched to flomax once daily and avodart. \par \par PSA in 7/2017 is 1.2 (on dutasteride).\par PSA in 7/2018 is 1.1 (on dutasteride)\par PSA in 8/2019 is 1.13 (on dutasteride)\par PSA 0.9 in 7/2020\par \par Since last visit, denies any changes in urination. No breast mass or enlargement.  He had evaluated and was found to have lipoma.  He restarted dutasteride and continues on flomax daily.  Reports  - good stream except first void, good emptying. Improved frequency.  Nocturia x1-2.  No dysuria or hematuria.\par \par Mild ED - reports less rigid erections, but he can maintain at times to completion.  He is currently taking 50mg viagra. He does orgasm and issue more is penetration.  No nitrates, no CP or SOB.  He was seen by a Cardiologist -   Recent Echo showed normal EF and RV.   \par \par Pt is currently on Eliquis for hx of PE.

## 2020-08-17 NOTE — ASSESSMENT
[FreeTextEntry1] : Patient is a 68 yo M with BPH/LUTS.  Also with ED.\par \par Pt restarted dutasteride again May 2020\par Remains on flomax. \par Pt takes viagra 50mg as needed. May titrate up to 100mg \par Overall doing well\par Stable PSA\par F/u 1 yr

## 2020-08-18 ENCOUNTER — NON-APPOINTMENT (OUTPATIENT)
Age: 70
End: 2020-08-18

## 2020-08-18 ENCOUNTER — APPOINTMENT (OUTPATIENT)
Dept: OPHTHALMOLOGY | Facility: CLINIC | Age: 70
End: 2020-08-18
Payer: MEDICARE

## 2020-08-18 PROCEDURE — 92004 COMPRE OPH EXAM NEW PT 1/>: CPT

## 2020-10-05 ENCOUNTER — RX RENEWAL (OUTPATIENT)
Age: 70
End: 2020-10-05

## 2020-10-19 NOTE — PROGRESS NOTE ADULT - PROBLEM/PLAN-2
Post Op Joint Instructions     · Once staples are removed and wound is cleared you may shower. Absolutely no soaking no baths no hot tubs.  · Do not place any ointments or creams on wound.  · If you have any concerns over wound please contact your provider.  · Continue Coumadin monitoring with [facility] for total of 30 days.    · After 30 days, take daily Aspirin 325mg twice daily for 8 weeks.  · Take no over-the-counter anti-inflammatory medications like Advil or ibuprofen while on blood thinners.    · Take no multivitamins while on Coumadin.  · Take your pain medications as instructed and only as needed for pain.  · Continue physical therapy as ordered, work on ROM (range of motion) and strengthening.     · If a hip replacement, use wedge pillow for 4 weeks and maintain hip precautions for 6 weeks.  · Contact our office if you have any postoperative questions or concerns at [place] [phone], or [place] [phone].  · Any medical questions should be directed to your primary care physician.    
DISPLAY PLAN FREE TEXT
DISPLAY PLAN FREE TEXT
Unable to obtain accurate history, pt confused. Pt was living at assisted living facility.

## 2020-10-20 ENCOUNTER — NON-APPOINTMENT (OUTPATIENT)
Age: 70
End: 2020-10-20

## 2020-10-20 ENCOUNTER — APPOINTMENT (OUTPATIENT)
Dept: CARDIOLOGY | Facility: CLINIC | Age: 70
End: 2020-10-20
Payer: MEDICARE

## 2020-10-20 VITALS
WEIGHT: 200 LBS | BODY MASS INDEX: 29.62 KG/M2 | TEMPERATURE: 97.1 F | DIASTOLIC BLOOD PRESSURE: 84 MMHG | OXYGEN SATURATION: 97 % | HEIGHT: 69 IN | HEART RATE: 63 BPM | SYSTOLIC BLOOD PRESSURE: 143 MMHG

## 2020-10-20 PROCEDURE — 93000 ELECTROCARDIOGRAM COMPLETE: CPT

## 2020-10-20 PROCEDURE — 99215 OFFICE O/P EST HI 40 MIN: CPT

## 2020-11-16 ENCOUNTER — RX RENEWAL (OUTPATIENT)
Age: 70
End: 2020-11-16

## 2020-12-25 ENCOUNTER — RX RENEWAL (OUTPATIENT)
Age: 70
End: 2020-12-25

## 2021-03-24 ENCOUNTER — RX RENEWAL (OUTPATIENT)
Age: 71
End: 2021-03-24

## 2021-04-26 ENCOUNTER — RX RENEWAL (OUTPATIENT)
Age: 71
End: 2021-04-26

## 2021-06-10 ENCOUNTER — RX RENEWAL (OUTPATIENT)
Age: 71
End: 2021-06-10

## 2021-06-14 ENCOUNTER — APPOINTMENT (OUTPATIENT)
Dept: FAMILY MEDICINE | Facility: CLINIC | Age: 71
End: 2021-06-14
Payer: MEDICARE

## 2021-06-14 VITALS
SYSTOLIC BLOOD PRESSURE: 134 MMHG | DIASTOLIC BLOOD PRESSURE: 80 MMHG | RESPIRATION RATE: 16 BRPM | WEIGHT: 201 LBS | OXYGEN SATURATION: 98 % | HEIGHT: 69 IN | BODY MASS INDEX: 29.77 KG/M2 | TEMPERATURE: 98.2 F | HEART RATE: 87 BPM

## 2021-06-14 PROCEDURE — 99214 OFFICE O/P EST MOD 30 MIN: CPT

## 2021-06-14 RX ORDER — DUTASTERIDE 0.5 MG/1
CAPSULE, LIQUID FILLED ORAL AT BEDTIME
Refills: 0 | Status: DISCONTINUED | COMMUNITY
End: 2021-06-14

## 2021-06-14 RX ORDER — SILDENAFIL 100 MG/1
100 TABLET, FILM COATED ORAL
Qty: 6 | Refills: 5 | Status: DISCONTINUED | COMMUNITY
Start: 2020-05-01 | End: 2021-06-14

## 2021-06-14 NOTE — ASSESSMENT
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/19\par -On Eliquis bid\par -CT scan done 01/20> internal resolution of PE\par -Seen by Pulmonology, Dr Finn\par -Seen by hematology at [facility].\par \par HTN:\par On Losartan and amlodipine.\par -Seen by cardiology at [facility]\par \par Arm Neuropathy:\par -seen by neurology at [facility]\par -Off Lyrica.\par \par Dyslipidemia:\par -On crestor\par \par GERD ;\par -on omeprazole\par \par BPH/ ED\par -on Tamsulosin and Dutasteride\par -F/up with urology, Dr Mooney\par \par Anxiety:\par Advise and counseling provided.\par mental counselor referral.\par \par Blood tests done 6/8/21> reviewed.\par Colonoscopy: 2018/ repeat q 5 years with Dr Zelaya at [facility].\par Immunizations: up to date.\par \par  \par

## 2021-06-14 NOTE — HEALTH RISK ASSESSMENT
[Yes] : Yes [No] : In the past 12 months have you used drugs other than those required for medical reasons? No [No falls in past year] : Patient reported no falls in the past year [] : No [de-identified] : Cardiology, Pulmonology, urology, hematology [de-identified] : weekly [de-identified] : 5-6/week. Golf occasional/ jogg-walks [de-identified] : healthy

## 2021-06-14 NOTE — DATA REVIEWED
[No studies available for review at this time.] : No studies available for review at this time. [FreeTextEntry1] : All lab tests done 6/8/21 reviewed.

## 2021-06-14 NOTE — HISTORY OF PRESENT ILLNESS
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 he try Viagra Rx by urology with no benefits. he is concern this might affect his relation with wife.\par  \par  \par

## 2021-06-14 NOTE — PHYSICAL EXAM
[Normal] : normal rate, regular rhythm, normal S1 and S2 and no murmur heard [___cm] : a ~M [unfilled] ~Ucm superior medial quadrant mass was palpated [2:00] : in the 2:00 position

## 2021-08-16 ENCOUNTER — RX RENEWAL (OUTPATIENT)
Age: 71
End: 2021-08-16

## 2021-08-19 ENCOUNTER — APPOINTMENT (OUTPATIENT)
Dept: OPHTHALMOLOGY | Facility: CLINIC | Age: 71
End: 2021-08-19
Payer: MEDICARE

## 2021-08-19 ENCOUNTER — NON-APPOINTMENT (OUTPATIENT)
Age: 71
End: 2021-08-19

## 2021-08-19 PROCEDURE — 92014 COMPRE OPH EXAM EST PT 1/>: CPT

## 2021-08-21 ENCOUNTER — NON-APPOINTMENT (OUTPATIENT)
Age: 71
End: 2021-08-21

## 2021-08-25 DIAGNOSIS — T14.8XXA OTHER INJURY OF UNSPECIFIED BODY REGION, INITIAL ENCOUNTER: ICD-10-CM

## 2021-08-31 ENCOUNTER — APPOINTMENT (OUTPATIENT)
Dept: UROLOGY | Facility: CLINIC | Age: 71
End: 2021-08-31
Payer: MEDICARE

## 2021-08-31 PROCEDURE — 99214 OFFICE O/P EST MOD 30 MIN: CPT

## 2021-08-31 NOTE — ASSESSMENT
[FreeTextEntry1] : Patient is a  71 yo M with BPH/LUTS and ED.\par \par From voiding standpoint, stable on dutasteride and tamsulosin.\par Discussed with pt treatment options for ED, including oral PDE5-is, injectable medications such as MUSE or ICI, HEATH, and penile prosthesis. He is not interested in injections or surgery.\par Willing to try cialis.  Again reviewed proper use and SE\par He did have PSA done with PSA - result from [facility] was sent, but I do not see any result\par Requested pt to re-send.  Will contact w result\par F/u 1 yr\par

## 2021-08-31 NOTE — HISTORY OF PRESENT ILLNESS
[FreeTextEntry1] : Patient is a 69 yo M who presents for f/u with BPH, mildly elevated PSA of 4.4.  Pt is a former pt of Dr Chavis's, however due to insurance is now transferring care to me. He has a longstanding h/o BPH and elevated PSA.  He had been on alpha blockers before but did not feel they were helpful and stopped Rapaflo due to ejaculatory side effects.  He was very bothered by ejaculatory side effects from Rapaflo, however he feels that many yrs when he tried flomax he did not have as significant ejaculatory side effects.. From Dr Chavis, His prostate is @90cc in volume and in 2015 PSA 4.4 with 18% free. \par \par Pt was previously was other regimens for BPH -  rapaflo and finasteride, but experienced increased pain in perineum/pelvis, particularly with ejaculation on rapaflo and finasteride.  Did not have this on flomax bid/avodart.  But had dizziness on bid flomax.\par \par He then switched to flomax once daily and avodart. \par \par PSA in 7/2017 is 1.2 (on dutasteride).\par PSA in 7/2018 is 1.1 (on dutasteride)\par PSA in 8/2019 is 1.13 (on dutasteride)\par PSA 0.9 in 7/2020 (on dutasteride with PCP)\par \par Since last visit, denies any changes in urination. No breast mass or enlargement.  He had evaluated and was found to have lipoma.  He restarted dutasteride and continues on flomax daily.  Reports  - good stream except first void, good emptying. Improved frequency.  Nocturia x1-2.  No dysuria or hematuria.\par \par He reports more difficulty obtaining an erection.  He reports 4/10 rigidity with viagra.  He feels normal good libido and desire.  He was taking 50 mg viagra in the past, but more recently took 100mg viagra and had blue visual changes.  He does orgasm and issue more is penetration.  No nitrates, no CP or SOB.  He did see an eye doctor and told no retina issues.\par \par Pt is currently on Eliquis for hx of PE, dose has lowered.

## 2021-09-08 ENCOUNTER — RX RENEWAL (OUTPATIENT)
Age: 71
End: 2021-09-08

## 2021-11-23 ENCOUNTER — NON-APPOINTMENT (OUTPATIENT)
Age: 71
End: 2021-11-23

## 2021-11-23 ENCOUNTER — APPOINTMENT (OUTPATIENT)
Dept: CARDIOLOGY | Facility: CLINIC | Age: 71
End: 2021-11-23
Payer: MEDICARE

## 2021-11-23 VITALS
WEIGHT: 198 LBS | OXYGEN SATURATION: 96 % | HEIGHT: 69 IN | DIASTOLIC BLOOD PRESSURE: 75 MMHG | HEART RATE: 75 BPM | BODY MASS INDEX: 29.33 KG/M2 | TEMPERATURE: 97.8 F | RESPIRATION RATE: 16 BRPM | SYSTOLIC BLOOD PRESSURE: 132 MMHG

## 2021-11-23 PROCEDURE — 93000 ELECTROCARDIOGRAM COMPLETE: CPT

## 2021-11-23 PROCEDURE — 99215 OFFICE O/P EST HI 40 MIN: CPT

## 2021-11-23 RX ORDER — LOSARTAN POTASSIUM 100 MG/1
100 TABLET, FILM COATED ORAL DAILY
Refills: 0 | Status: ACTIVE | COMMUNITY

## 2021-11-23 RX ORDER — APIXABAN 2.5 MG/1
2.5 TABLET, FILM COATED ORAL
Refills: 0 | Status: ACTIVE | COMMUNITY
Start: 2019-10-10

## 2021-11-23 RX ORDER — AMLODIPINE BESYLATE 5 MG/1
5 TABLET ORAL DAILY
Refills: 0 | Status: ACTIVE | COMMUNITY

## 2021-12-20 ENCOUNTER — LABORATORY RESULT (OUTPATIENT)
Age: 71
End: 2021-12-20

## 2021-12-20 ENCOUNTER — APPOINTMENT (OUTPATIENT)
Dept: FAMILY MEDICINE | Facility: CLINIC | Age: 71
End: 2021-12-20
Payer: MEDICARE

## 2021-12-20 VITALS
TEMPERATURE: 98.3 F | DIASTOLIC BLOOD PRESSURE: 78 MMHG | SYSTOLIC BLOOD PRESSURE: 126 MMHG | WEIGHT: 203 LBS | OXYGEN SATURATION: 98 % | HEART RATE: 81 BPM | BODY MASS INDEX: 30.07 KG/M2 | HEIGHT: 69 IN | RESPIRATION RATE: 16 BRPM

## 2021-12-20 PROCEDURE — 36415 COLL VENOUS BLD VENIPUNCTURE: CPT

## 2021-12-20 PROCEDURE — G0439: CPT

## 2021-12-20 RX ORDER — SILVER SULFADIAZINE 10 MG/G
1 CREAM TOPICAL TWICE DAILY
Qty: 1 | Refills: 0 | Status: DISCONTINUED | COMMUNITY
Start: 2021-08-25 | End: 2021-12-20

## 2021-12-20 NOTE — PHYSICAL EXAM
[Well Nourished] : well nourished [Well Developed] : well developed [Well-Appearing] : well-appearing [Normal] : no acute distress, well nourished, well developed and well-appearing

## 2021-12-20 NOTE — HEALTH RISK ASSESSMENT
[Good] : ~his/her~ current health as good [Fair] :  ~his/her~ mood as fair [Yes] : Yes [2 - 4 times a month (2 pts)] : 2-4 times a month (2 points) [1 or 2 (0 pts)] : 1 or 2 (0 points) [No] : In the past 12 months have you used drugs other than those required for medical reasons? No [No falls in past year] : Patient reported no falls in the past year [Patient reported colonoscopy was abnormal] : Patient reported colonoscopy was abnormal [HIV test declined] : HIV test declined [Hepatitis C test declined] : Hepatitis C test declined [None] : None [With Significant Other] : lives with significant other [Retired] : retired [] :  [# Of Children ___] : has [unfilled] children [Feels Safe at Home] : Feels safe at home [Fully functional (bathing, dressing, toileting, transferring, walking, feeding)] : Fully functional (bathing, dressing, toileting, transferring, walking, feeding) [Fully functional (using the telephone, shopping, preparing meals, housekeeping, doing laundry, using] : Fully functional and needs no help or supervision to perform IADLs (using the telephone, shopping, preparing meals, housekeeping, doing laundry, using transportation, managing medications and managing finances) [Smoke Detector] : smoke detector [Safety elements used in home] : safety elements used in home [Seat Belt] :  uses seat belt [Former] : Former [1] : 2) Feeling down, depressed, or hopeless for several days (1) [PHQ-2 Positive] : PHQ-2 Positive [I have developed a follow-up plan documented below in the note.] : I have developed a follow-up plan documented below in the note. [de-identified] : quit at 28 y/o [de-identified] : daily until last week. Now weekly [de-identified] : [facility] in [place] [facility] [de-identified] : walking and jogging daily [de-identified] : healthy [Change in mental status noted] : No change in mental status noted [Language] : denies difficulty with language [Handling Complex Tasks] : denies difficulty handling complex tasks [Sexually Active] : not sexually active [Reports changes in hearing] : Reports no changes in hearing [Reports changes in vision] : Reports no changes in vision [Reports changes in dental health] : Reports no changes in dental health [TB Exposure] : is not being exposed to tuberculosis [ColonoscopyDate] : 2018 [ColonoscopyComments] : Polypectomy with GI: Dr Griffith at [facility] [FreeTextEntry2] : 1/ week tours at [facility] [FreeTextEntry3] : 4 grandchildrens

## 2021-12-20 NOTE — ASSESSMENT
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

## 2021-12-20 NOTE — HISTORY OF PRESENT ILLNESS
[FreeTextEntry1] : CPE [de-identified] : Previous PCP at [facility].\par Pt is retired teacher for 12 years ().\par Pt with hx PE in 9/29/19 on Eliquis.\par Neuropathy on Lyrica taper down with Neurology.\par BPH on tamsulosin and Dutasteride, seen by urology, Dyslipidemia on crestor, GERD on omeprazole.\par Seen by Cardiology at [facility], Dr Sal, and hematology at [facility]..\par He is concern about ED. he try tadalafil and Viagra Rx by urology with no benefits. he is concern this might affect his relation with wife.\par  \par \par

## 2021-12-22 LAB
25(OH)D3 SERPL-MCNC: 28.3 NG/ML
ALBUMIN SERPL ELPH-MCNC: 4.8 G/DL
ALP BLD-CCNC: 57 U/L
ALT SERPL-CCNC: 20 U/L
ANION GAP SERPL CALC-SCNC: 11 MMOL/L
APPEARANCE: ABNORMAL
AST SERPL-CCNC: 21 U/L
BASOPHILS # BLD AUTO: 0.05 K/UL
BASOPHILS NFR BLD AUTO: 0.6 %
BILIRUB SERPL-MCNC: 0.5 MG/DL
BILIRUBIN URINE: NEGATIVE
BLOOD URINE: NEGATIVE
BUN SERPL-MCNC: 16 MG/DL
CALCIUM SERPL-MCNC: 9.8 MG/DL
CHLORIDE SERPL-SCNC: 106 MMOL/L
CHOLEST SERPL-MCNC: 165 MG/DL
CO2 SERPL-SCNC: 25 MMOL/L
COLOR: YELLOW
CREAT SERPL-MCNC: 1.06 MG/DL
EOSINOPHIL # BLD AUTO: 0.06 K/UL
EOSINOPHIL NFR BLD AUTO: 0.7 %
GLUCOSE QUALITATIVE U: NEGATIVE
GLUCOSE SERPL-MCNC: 104 MG/DL
HCT VFR BLD CALC: 49.6 %
HDLC SERPL-MCNC: 75 MG/DL
HGB BLD-MCNC: 16.2 G/DL
IMM GRANULOCYTES NFR BLD AUTO: 0.2 %
KETONES URINE: NEGATIVE
LDLC SERPL CALC-MCNC: 69 MG/DL
LEUKOCYTE ESTERASE URINE: ABNORMAL
LYMPHOCYTES # BLD AUTO: 2.24 K/UL
LYMPHOCYTES NFR BLD AUTO: 25 %
MAN DIFF?: NORMAL
MCHC RBC-ENTMCNC: 31.1 PG
MCHC RBC-ENTMCNC: 32.7 GM/DL
MCV RBC AUTO: 95.2 FL
MONOCYTES # BLD AUTO: 0.69 K/UL
MONOCYTES NFR BLD AUTO: 7.7 %
NEUTROPHILS # BLD AUTO: 5.89 K/UL
NEUTROPHILS NFR BLD AUTO: 65.8 %
NITRITE URINE: NEGATIVE
NONHDLC SERPL-MCNC: 90 MG/DL
PH URINE: 5
PLATELET # BLD AUTO: 264 K/UL
POTASSIUM SERPL-SCNC: 4.6 MMOL/L
PROT SERPL-MCNC: 7.1 G/DL
PROTEIN URINE: NEGATIVE
RBC # BLD: 5.21 M/UL
RBC # FLD: 13.8 %
SODIUM SERPL-SCNC: 142 MMOL/L
SPECIFIC GRAVITY URINE: 1.02
TRIGL SERPL-MCNC: 104 MG/DL
TSH SERPL-ACNC: 1.87 UIU/ML
UROBILINOGEN URINE: NORMAL
WBC # FLD AUTO: 8.95 K/UL

## 2021-12-23 ENCOUNTER — APPOINTMENT (OUTPATIENT)
Dept: FAMILY MEDICINE | Facility: CLINIC | Age: 71
End: 2021-12-23
Payer: MEDICARE

## 2021-12-23 ENCOUNTER — TRANSCRIPTION ENCOUNTER (OUTPATIENT)
Age: 71
End: 2021-12-23

## 2021-12-23 VITALS
BODY MASS INDEX: 30.36 KG/M2 | HEART RATE: 90 BPM | TEMPERATURE: 97.9 F | WEIGHT: 205 LBS | DIASTOLIC BLOOD PRESSURE: 78 MMHG | OXYGEN SATURATION: 98 % | SYSTOLIC BLOOD PRESSURE: 120 MMHG | HEIGHT: 69 IN | RESPIRATION RATE: 16 BRPM

## 2021-12-23 DIAGNOSIS — Z11.59 ENCOUNTER FOR SCREENING FOR OTHER VIRAL DISEASES: ICD-10-CM

## 2021-12-23 LAB
BILIRUB UR QL STRIP: NEGATIVE
GLUCOSE UR-MCNC: NEGATIVE
HCG UR QL: 0.2 EU/DL
HGB UR QL STRIP.AUTO: NORMAL
KETONES UR-MCNC: NEGATIVE
LEUKOCYTE ESTERASE UR QL STRIP: NORMAL
NITRITE UR QL STRIP: NORMAL
PH UR STRIP: 5.5
PROT UR STRIP-MCNC: NORMAL
SP GR UR STRIP: 1.01

## 2021-12-23 PROCEDURE — 99214 OFFICE O/P EST MOD 30 MIN: CPT | Mod: 25

## 2021-12-23 PROCEDURE — 81003 URINALYSIS AUTO W/O SCOPE: CPT | Mod: QW

## 2021-12-23 NOTE — HISTORY OF PRESENT ILLNESS
[FreeTextEntry8] : He presents today for evaluation of Blood in urine. He states last week had a lower abdominal pain that resolved. Last night he had dysuria with voiding and blood in urine with severe pain.\par he denies similar episodes in the past\par Pt with BPH on Dutasteride and Tamsulosin.

## 2021-12-23 NOTE — ASSESSMENT
[FreeTextEntry1] : Pt presents with acute onset of lower abdominal pain, dysuria for aprox 1 wek and recent episode of hematuria:\par \par Acute UTI w. hematuria:\par -R/O stones> CT order\par -Urine culture.\par -Start Cipro x 1 week\par -further recommendations with lab results.

## 2021-12-23 NOTE — HEALTH RISK ASSESSMENT
[Never] : Never [No] : In the past 12 months have you used drugs other than those required for medical reasons? No

## 2021-12-27 LAB
BACTERIA UR CULT: NORMAL
SARS-COV-2 N GENE NPH QL NAA+PROBE: NOT DETECTED

## 2021-12-29 ENCOUNTER — OUTPATIENT (OUTPATIENT)
Dept: OUTPATIENT SERVICES | Facility: HOSPITAL | Age: 71
LOS: 1 days | End: 2021-12-29
Payer: MEDICARE

## 2021-12-29 ENCOUNTER — APPOINTMENT (OUTPATIENT)
Dept: CT IMAGING | Facility: CLINIC | Age: 71
End: 2021-12-29
Payer: MEDICARE

## 2021-12-29 DIAGNOSIS — R31.0 GROSS HEMATURIA: ICD-10-CM

## 2021-12-29 PROCEDURE — G1004: CPT

## 2021-12-29 PROCEDURE — 74178 CT ABD&PLV WO CNTR FLWD CNTR: CPT | Mod: 26,ME

## 2021-12-29 PROCEDURE — 74178 CT ABD&PLV WO CNTR FLWD CNTR: CPT | Mod: ME

## 2021-12-29 PROCEDURE — 82565 ASSAY OF CREATININE: CPT

## 2021-12-30 ENCOUNTER — NON-APPOINTMENT (OUTPATIENT)
Age: 71
End: 2021-12-30

## 2022-01-04 ENCOUNTER — APPOINTMENT (OUTPATIENT)
Dept: UROLOGY | Facility: CLINIC | Age: 72
End: 2022-01-04
Payer: MEDICARE

## 2022-01-04 ENCOUNTER — OUTPATIENT (OUTPATIENT)
Dept: OUTPATIENT SERVICES | Facility: HOSPITAL | Age: 72
LOS: 1 days | End: 2022-01-04
Payer: MEDICARE

## 2022-01-04 VITALS
TEMPERATURE: 98.2 F | RESPIRATION RATE: 16 BRPM | SYSTOLIC BLOOD PRESSURE: 159 MMHG | HEART RATE: 76 BPM | DIASTOLIC BLOOD PRESSURE: 89 MMHG

## 2022-01-04 DIAGNOSIS — R31.0 GROSS HEMATURIA: ICD-10-CM

## 2022-01-04 DIAGNOSIS — R35.0 FREQUENCY OF MICTURITION: ICD-10-CM

## 2022-01-04 PROCEDURE — 52000 CYSTOURETHROSCOPY: CPT

## 2022-01-04 PROCEDURE — 88112 CYTOPATH CELL ENHANCE TECH: CPT | Mod: 26

## 2022-01-07 ENCOUNTER — APPOINTMENT (OUTPATIENT)
Age: 72
End: 2022-01-07

## 2022-01-07 LAB
APPEARANCE: CLEAR
BACTERIA UR CULT: NORMAL
BACTERIA: NEGATIVE
BILIRUBIN URINE: NEGATIVE
BLOOD URINE: NEGATIVE
COLOR: COLORLESS
GLUCOSE QUALITATIVE U: NEGATIVE
HYALINE CASTS: 0 /LPF
KETONES URINE: NEGATIVE
LEUKOCYTE ESTERASE URINE: NEGATIVE
MICROSCOPIC-UA: NORMAL
NITRITE URINE: NEGATIVE
PH URINE: 6
PROTEIN URINE: NEGATIVE
RED BLOOD CELLS URINE: 0 /HPF
SPECIFIC GRAVITY URINE: 1.01
SQUAMOUS EPITHELIAL CELLS: 0 /HPF
UROBILINOGEN URINE: NORMAL
WHITE BLOOD CELLS URINE: 0 /HPF

## 2022-03-28 ENCOUNTER — NON-APPOINTMENT (OUTPATIENT)
Age: 72
End: 2022-03-28

## 2022-03-29 ENCOUNTER — APPOINTMENT (OUTPATIENT)
Dept: UROLOGY | Facility: CLINIC | Age: 72
End: 2022-03-29
Payer: MEDICARE

## 2022-03-29 VITALS — HEART RATE: 68 BPM | DIASTOLIC BLOOD PRESSURE: 82 MMHG | SYSTOLIC BLOOD PRESSURE: 151 MMHG | RESPIRATION RATE: 16 BRPM

## 2022-03-29 PROCEDURE — 88112 CYTOPATH CELL ENHANCE TECH: CPT | Mod: 26

## 2022-03-29 PROCEDURE — 99214 OFFICE O/P EST MOD 30 MIN: CPT

## 2022-03-29 NOTE — HISTORY OF PRESENT ILLNESS
[FreeTextEntry1] : Patient is a 70 yo M who presents for f/u with BPH.\par \par PRIOR hx:  Pt is a former pt of Dr Chavis's, however due to insurance is now transferring care to me. He has a longstanding h/o BPH and elevated PSA.  He had been on alpha blockers before but did not feel they were helpful and stopped Rapaflo due to ejaculatory side effects.  He was very bothered by ejaculatory side effects from Rapaflo, however he feels that many yrs when he tried flomax he did not have as significant ejaculatory side effects.. From Dr Chavis, His prostate is @90cc in volume and in 2015 PSA 4.4 with 18% free. \par \par Pt was previously was other regimens for BPH -  rapaflo and finasteride, but experienced increased pain in perineum/pelvis, particularly with ejaculation on rapaflo and finasteride.  Did not have this on flomax bid/avodart.  But had dizziness on bid flomax.\par \par He then switched to flomax once daily and avodart. \par \par PSA in 7/2017 is 1.2 (on dutasteride).\par PSA in 7/2018 is 1.1 (on dutasteride)\par PSA in 8/2019 is 1.13 (on dutasteride)\par PSA 0.9 in 7/2020 (on dutasteride with PCP)\par \par At baseline good stream except first void, good emptying. Improved frequency.  Nocturia x1-2.  No dysuria or hematuria.\par \par He reports more difficulty obtaining an erection.  He reports 4/10 rigidity with viagra.  He feels normal good libido and desire.  He was taking 50 mg viagra in the past, but more recently took 100mg viagra and had blue visual changes.  He does orgasm and issue more is penetration.  No nitrates, no CP or SOB.  He did see an eye doctor and told no retina issues.\par \par Pt is currently on Eliquis for hx of PE, dose has lowered.\par \par Interval hx:\par He had gross hematuria in 12/2021.\par Underwent cystoscopy and CTU that were unrevealing also there was evidence of inflammation on cysto.\par \par He comes in today for recurrent gross hematuria.  He developed mild burning/dysuria sensation and then noted gross hematuria yesterday.  Resolved after 1 day and drinking lots of water.  Overnight did void ~20 times but drank ?gallon. Denies fever/chills, flank pain or other change in health.

## 2022-03-29 NOTE — ASSESSMENT
[FreeTextEntry1] : Patient is a 70 yo M with BPH/LUTS.\par \par Recurrent gross hematuria, recent neg CT\par ?irritation on cysto in Jan 2022\par Will repeat urine testing - empiric abx\par F/u for repeat cysto - if persistent irritated/inflammed appearance, may consider OR biopsy as pt would need clearance to stop blood thinners\par PVR today 55cc, initially 300cc\par Also d/w pt BPH surgery as hematuria may be related to BPH

## 2022-03-29 NOTE — PHYSICAL EXAM
[General Appearance - Well Developed] : well developed [General Appearance - Well Nourished] : well nourished [Normal Appearance] : normal appearance [Well Groomed] : well groomed [General Appearance - In No Acute Distress] : no acute distress [Urethral Meatus] : meatus normal [Urinary Bladder Findings] : the bladder was normal on palpation [Scrotum] : the scrotum was normal [Testes Mass (___cm)] : there were no testicular masses [Oriented To Time, Place, And Person] : oriented to person, place, and time [Affect] : the affect was normal [Mood] : the mood was normal [Not Anxious] : not anxious

## 2022-03-31 ENCOUNTER — APPOINTMENT (OUTPATIENT)
Age: 72
End: 2022-03-31

## 2022-03-31 LAB
APPEARANCE: CLEAR
BACTERIA: NEGATIVE
BILIRUBIN URINE: NEGATIVE
BLOOD URINE: NEGATIVE
COLOR: COLORLESS
GLUCOSE QUALITATIVE U: NEGATIVE
HYALINE CASTS: 1 /LPF
KETONES URINE: NEGATIVE
LEUKOCYTE ESTERASE URINE: ABNORMAL
MICROSCOPIC-UA: NORMAL
NITRITE URINE: NEGATIVE
PH URINE: 6
PROTEIN URINE: NEGATIVE
RED BLOOD CELLS URINE: 1 /HPF
SPECIFIC GRAVITY URINE: 1.01
SQUAMOUS EPITHELIAL CELLS: 1 /HPF
URINE CYTOLOGY: NORMAL
UROBILINOGEN URINE: NORMAL
WHITE BLOOD CELLS URINE: 43 /HPF

## 2022-04-05 ENCOUNTER — APPOINTMENT (OUTPATIENT)
Dept: UROLOGY | Facility: CLINIC | Age: 72
End: 2022-04-05
Payer: MEDICARE

## 2022-04-05 ENCOUNTER — OUTPATIENT (OUTPATIENT)
Dept: OUTPATIENT SERVICES | Facility: HOSPITAL | Age: 72
LOS: 1 days | End: 2022-04-05
Payer: MEDICARE

## 2022-04-05 DIAGNOSIS — R35.0 FREQUENCY OF MICTURITION: ICD-10-CM

## 2022-04-05 PROCEDURE — 52000 CYSTOURETHROSCOPY: CPT

## 2022-04-05 NOTE — PROGRESS NOTE ADULT - SUBJECTIVE AND OBJECTIVE BOX
Cardiology service called for reevaluation due to concerns for acute onset severe pain and numbness/tingling of fingers. Eliquis therapy started yesterday    Patient reports 10/10 sharp pain with radiation down arm.     MEDICATIONS  (STANDING):  apixaban 10 milliGRAM(s) Oral every 12 hours  atorvastatin 80 milliGRAM(s) Oral at bedtime  docusate sodium 100 milliGRAM(s) Oral two times a day  dutasteride 0.5 milliGRAM(s) Oral daily  influenza   Vaccine 0.5 milliLiter(s) IntraMuscular once  pantoprazole    Tablet 40 milliGRAM(s) Oral before breakfast  polyethylene glycol 3350 17 Gram(s) Oral daily  senna 2 Tablet(s) Oral at bedtime  tamsulosin 0.4 milliGRAM(s) Oral at bedtime    MEDICATIONS  (PRN):  ALPRAZolam 0.25 milliGRAM(s) Oral every 8 hours PRN Anxiety  heparin  Injectable 6500 Unit(s) IV Push every 6 hours PRN For aPTT less than 40  heparin  Injectable 3000 Unit(s) IV Push every 6 hours PRN For aPTT between 40 - 57  oxyCODONE    5 mG/acetaminophen 325 mG 1 Tablet(s) Oral every 4 hours PRN Moderate Pain (4 - 6)  oxyCODONE    5 mG/acetaminophen 325 mG 2 Tablet(s) Oral every 4 hours PRN Severe Pain (7 - 10)  simethicone 80 milliGRAM(s) Chew every 8 hours PRN Gas      Vital Signs Last 24 Hrs  T(C): 36.9 (03 Oct 2019 15:52), Max: 37.2 (02 Oct 2019 20:15)  T(F): 98.4 (03 Oct 2019 15:52), Max: 98.9 (02 Oct 2019 20:15)  HR: 92 (03 Oct 2019 17:18) (78 - 124)  BP: 146/81 (03 Oct 2019 15:52) (122/62 - 163/87)  BP(mean): 94 (03 Oct 2019 10:00) (88 - 116)  RR: 18 (03 Oct 2019 17:18) (14 - 49)  SpO2: 97% (03 Oct 2019 17:18) (91% - 97%)    Constitutional: NAD, well-groomed, well-developed  HEENT: PERRLA, EOMI, no drainage or redness  Extremities: No peripheral edema, No cyanosis, clubbing   RUE with ecchymosis of inner upper arm.  Severe tenderness, gross motor function and sensory function intact  Vascular: Equal and normal pulses: 2+ peripheral pulses throughou      I&O's Detail    02 Oct 2019 07:01  -  03 Oct 2019 07:00  --------------------------------------------------------  IN:    heparin  Infusion.: 286 mL    Oral Fluid: 740 mL  Total IN: 1026 mL    OUT:    Voided: 2175 mL  Total OUT: 2175 mL    Total NET: -1149 mL      03 Oct 2019 07:01  -  03 Oct 2019 18:13  --------------------------------------------------------  IN:    heparin  Infusion.: 26 mL  Total IN: 26 mL    OUT:    Voided: 825 mL  Total OUT: 825 mL    Total NET: -799 mL          LABS:                        12.1   16.33 )-----------( 251      ( 03 Oct 2019 06:47 )             36.4     10-03    138  |  102  |  9.0  ----------------------------<  128<H>  4.3   |  25.0  |  0.79    Ca    8.8      03 Oct 2019 06:47  Phos  3.7     10-03  Mg     2.2     10-03      PTT - ( 03 Oct 2019 06:48 )  PTT:62.1 sec      RADIOLOGY & ADDITIONAL STUDIES: Cardiology service called for reevaluation due to concerns for acute onset severe pain of right elbow and wrist and numbness/tingling of fingers. Eliquis therapy started yesterday    Patient reports 10/10 sharp pain with radiation down arm.     MEDICATIONS  (STANDING):  apixaban 10 milliGRAM(s) Oral every 12 hours  atorvastatin 80 milliGRAM(s) Oral at bedtime  docusate sodium 100 milliGRAM(s) Oral two times a day  dutasteride 0.5 milliGRAM(s) Oral daily  influenza   Vaccine 0.5 milliLiter(s) IntraMuscular once  pantoprazole    Tablet 40 milliGRAM(s) Oral before breakfast  polyethylene glycol 3350 17 Gram(s) Oral daily  senna 2 Tablet(s) Oral at bedtime  tamsulosin 0.4 milliGRAM(s) Oral at bedtime    MEDICATIONS  (PRN):  ALPRAZolam 0.25 milliGRAM(s) Oral every 8 hours PRN Anxiety  heparin  Injectable 6500 Unit(s) IV Push every 6 hours PRN For aPTT less than 40  heparin  Injectable 3000 Unit(s) IV Push every 6 hours PRN For aPTT between 40 - 57  oxyCODONE    5 mG/acetaminophen 325 mG 1 Tablet(s) Oral every 4 hours PRN Moderate Pain (4 - 6)  oxyCODONE    5 mG/acetaminophen 325 mG 2 Tablet(s) Oral every 4 hours PRN Severe Pain (7 - 10)  simethicone 80 milliGRAM(s) Chew every 8 hours PRN Gas      Vital Signs Last 24 Hrs  T(C): 36.9 (03 Oct 2019 15:52), Max: 37.2 (02 Oct 2019 20:15)  T(F): 98.4 (03 Oct 2019 15:52), Max: 98.9 (02 Oct 2019 20:15)  HR: 92 (03 Oct 2019 17:18) (78 - 124)  BP: 146/81 (03 Oct 2019 15:52) (122/62 - 163/87)  BP(mean): 94 (03 Oct 2019 10:00) (88 - 116)  RR: 18 (03 Oct 2019 17:18) (14 - 49)  SpO2: 97% (03 Oct 2019 17:18) (91% - 97%)    Constitutional: NAD, well-groomed, well-developed  HEENT: PERRLA, EOMI, no drainage or redness  Extremities: No peripheral edema, No cyanosis, clubbing   RUE with ecchymosis of inner upper arm.  Severe tenderness, gross motor function and sensory function intact  Vascular: Equal and normal pulses: 2+ peripheral pulses throughou      I&O's Detail    02 Oct 2019 07:01  -  03 Oct 2019 07:00  --------------------------------------------------------  IN:    heparin  Infusion.: 286 mL    Oral Fluid: 740 mL  Total IN: 1026 mL    OUT:    Voided: 2175 mL  Total OUT: 2175 mL    Total NET: -1149 mL      03 Oct 2019 07:01  -  03 Oct 2019 18:13  --------------------------------------------------------  IN:    heparin  Infusion.: 26 mL  Total IN: 26 mL    OUT:    Voided: 825 mL  Total OUT: 825 mL    Total NET: -799 mL          LABS:                        12.1   16.33 )-----------( 251      ( 03 Oct 2019 06:47 )             36.4     10-03    138  |  102  |  9.0  ----------------------------<  128<H>  4.3   |  25.0  |  0.79    Ca    8.8      03 Oct 2019 06:47  Phos  3.7     10-03  Mg     2.2     10-03      PTT - ( 03 Oct 2019 06:48 )  PTT:62.1 sec      RADIOLOGY & ADDITIONAL STUDIES: - on several meds, failed dysphagia screen, restart if able to tolerate diet as per S&S rosie

## 2022-04-06 DIAGNOSIS — R31.0 GROSS HEMATURIA: ICD-10-CM

## 2022-04-06 DIAGNOSIS — N40.0 BENIGN PROSTATIC HYPERPLASIA WITHOUT LOWER URINARY TRACT SYMPTOMS: ICD-10-CM

## 2022-04-11 PROBLEM — Z11.59 SCREENING FOR VIRAL DISEASE: Status: RESOLVED | Noted: 2020-06-03 | Resolved: 2021-12-23

## 2022-04-11 PROBLEM — Z11.59 SCREENING FOR VIRAL DISEASE: Status: ACTIVE | Noted: 2021-12-23

## 2022-04-18 ENCOUNTER — TRANSCRIPTION ENCOUNTER (OUTPATIENT)
Age: 72
End: 2022-04-18

## 2022-04-18 ENCOUNTER — NON-APPOINTMENT (OUTPATIENT)
Age: 72
End: 2022-04-18

## 2022-05-05 ENCOUNTER — APPOINTMENT (OUTPATIENT)
Age: 72
End: 2022-05-05

## 2022-05-05 LAB
PSA FREE FLD-MCNC: 23 %
PSA FREE SERPL-MCNC: 0.2 NG/ML
PSA SERPL-MCNC: 0.85 NG/ML
PSA SERPL-MCNC: 0.87 NG/ML

## 2022-05-19 ENCOUNTER — RX RENEWAL (OUTPATIENT)
Age: 72
End: 2022-05-19

## 2022-06-08 ENCOUNTER — APPOINTMENT (OUTPATIENT)
Dept: FAMILY MEDICINE | Facility: CLINIC | Age: 72
End: 2022-06-08
Payer: MEDICARE

## 2022-06-08 VITALS
RESPIRATION RATE: 16 BRPM | HEIGHT: 69 IN | WEIGHT: 204 LBS | DIASTOLIC BLOOD PRESSURE: 70 MMHG | TEMPERATURE: 98 F | HEART RATE: 68 BPM | OXYGEN SATURATION: 98 % | BODY MASS INDEX: 30.21 KG/M2 | SYSTOLIC BLOOD PRESSURE: 100 MMHG

## 2022-06-08 PROCEDURE — 99214 OFFICE O/P EST MOD 30 MIN: CPT

## 2022-06-08 RX ORDER — SULFAMETHOXAZOLE AND TRIMETHOPRIM 800; 160 MG/1; MG/1
800-160 TABLET ORAL TWICE DAILY
Qty: 10 | Refills: 0 | Status: DISCONTINUED | COMMUNITY
Start: 2022-03-29 | End: 2022-06-08

## 2022-06-08 RX ORDER — SULFAMETHOXAZOLE AND TRIMETHOPRIM 800; 160 MG/1; MG/1
800-160 TABLET ORAL TWICE DAILY
Qty: 6 | Refills: 0 | Status: DISCONTINUED | COMMUNITY
Start: 2022-03-29 | End: 2022-06-08

## 2022-06-08 RX ORDER — CIPROFLOXACIN HYDROCHLORIDE 500 MG/1
500 TABLET, FILM COATED ORAL
Qty: 14 | Refills: 0 | Status: DISCONTINUED | COMMUNITY
Start: 2021-12-23 | End: 2022-06-08

## 2022-06-08 NOTE — HISTORY OF PRESENT ILLNESS
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 Seen by Hematology Dr Reich at [facility]. he states is still on Eliquis for prevention.\par Neuropathy on Lyrica taper down with Neurology.\par BPH on tamsulosin and Dutasteride, seen by urology, Dyslipidemia on crestor, GERD on omeprazole.\par Seen by Urology for evaluation of Gross hematuria. S/P normal CT and 2 cystoscopies normal.\par Seen by Cardiology at [facility], Dr Sal, and hematology at [facility]..\par He states is now seen by Counseling for Anxiety.\par

## 2022-06-08 NOTE — ASSESSMENT
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\par -On Eliquis bid> preventive?> Med risks and benefits d/w pt.\par -F/up with Hematology, Dr Reich at [facility]\par -CT scan done 01/20> internal resolution of PE\par -Seen by Pulmonology, Dr Finn\par \par Arm Neuropathy:\par -seen by neurology at [facility]\par -On Lyrica\par \par Dyslipidemia/ HTN\par -On Amlodipine and Losartan.\par -On crestor\par -F/up with cardiology at [facility]\par \par GERD ;\par -on omeprazole\par \par BPH/ GRoss hematuria x 2> secondary to Eliquis?\par -CT normal.\par -S/P Cystoscopy x 2\par -Schedule for 3rd cystoscopy with bxp 07/15/22> needs clearance.\par -on Tamsulosin\par -F/up with urology, Dr Mooney\par \par Anxiety> now under counseling treatment.\par Blood and UA today\par Colonoscopy: 2018/ repeat q 5 years\par Immunizations: up to date.\par \par

## 2022-06-08 NOTE — COUNSELING
Paged for panic attack. Pt reports weaning himself off of Paxil 1 year ago. Says his room is too small, he doesn't know what to do or how to calm down. Talked to Dr. Casey. Order for one time order PO ativan 0.5 mg.     [Fall prevention counseling provided] : Fall prevention counseling provided [Behavioral health counseling provided] : Behavioral health counseling provided [None] : None [Good understanding] : Patient has a good understanding of lifestyle changes and steps needed to achieve self management goal

## 2022-06-08 NOTE — HEALTH RISK ASSESSMENT
[Never] : Never [Yes] : Yes [No] : In the past 12 months have you used drugs other than those required for medical reasons? No [de-identified] : UROLOGY

## 2022-07-13 ENCOUNTER — APPOINTMENT (OUTPATIENT)
Dept: FAMILY MEDICINE | Facility: CLINIC | Age: 72
End: 2022-07-13

## 2022-07-15 ENCOUNTER — OUTPATIENT (OUTPATIENT)
Dept: OUTPATIENT SERVICES | Facility: HOSPITAL | Age: 72
LOS: 1 days | End: 2022-07-15
Payer: MEDICARE

## 2022-07-15 ENCOUNTER — APPOINTMENT (OUTPATIENT)
Dept: UROLOGY | Facility: CLINIC | Age: 72
End: 2022-07-15

## 2022-07-15 VITALS — DIASTOLIC BLOOD PRESSURE: 79 MMHG | SYSTOLIC BLOOD PRESSURE: 144 MMHG

## 2022-07-15 DIAGNOSIS — R31.0 GROSS HEMATURIA: ICD-10-CM

## 2022-07-15 DIAGNOSIS — R35.0 FREQUENCY OF MICTURITION: ICD-10-CM

## 2022-07-15 PROCEDURE — 52000 CYSTOURETHROSCOPY: CPT

## 2022-07-15 PROCEDURE — 88112 CYTOPATH CELL ENHANCE TECH: CPT | Mod: 26

## 2022-07-18 ENCOUNTER — APPOINTMENT (OUTPATIENT)
Age: 72
End: 2022-07-18

## 2022-07-18 LAB
APPEARANCE: CLEAR
BACTERIA UR CULT: NORMAL
BACTERIA: NEGATIVE
BILIRUBIN URINE: NEGATIVE
BLOOD URINE: NEGATIVE
COLOR: COLORLESS
GLUCOSE QUALITATIVE U: NEGATIVE
HYALINE CASTS: 0 /LPF
KETONES URINE: NEGATIVE
LEUKOCYTE ESTERASE URINE: NEGATIVE
MICROSCOPIC-UA: NORMAL
NITRITE URINE: NEGATIVE
PH URINE: 6
PROTEIN URINE: NEGATIVE
RED BLOOD CELLS URINE: 0 /HPF
SPECIFIC GRAVITY URINE: 1.01
SQUAMOUS EPITHELIAL CELLS: 0 /HPF
URINE CYTOLOGY: NORMAL
UROBILINOGEN URINE: NORMAL
WHITE BLOOD CELLS URINE: 0 /HPF

## 2022-07-19 ENCOUNTER — NON-APPOINTMENT (OUTPATIENT)
Age: 72
End: 2022-07-19

## 2022-07-25 ENCOUNTER — TRANSCRIPTION ENCOUNTER (OUTPATIENT)
Age: 72
End: 2022-07-25

## 2022-07-25 ENCOUNTER — APPOINTMENT (OUTPATIENT)
Dept: FAMILY MEDICINE | Facility: CLINIC | Age: 72
End: 2022-07-25

## 2022-07-25 VITALS
HEART RATE: 70 BPM | RESPIRATION RATE: 14 BRPM | OXYGEN SATURATION: 98 % | TEMPERATURE: 97.2 F | BODY MASS INDEX: 29.62 KG/M2 | SYSTOLIC BLOOD PRESSURE: 134 MMHG | HEIGHT: 69 IN | DIASTOLIC BLOOD PRESSURE: 78 MMHG | WEIGHT: 200 LBS

## 2022-07-25 DIAGNOSIS — M54.9 DORSALGIA, UNSPECIFIED: ICD-10-CM

## 2022-07-25 PROCEDURE — 99214 OFFICE O/P EST MOD 30 MIN: CPT | Mod: 25

## 2022-07-25 PROCEDURE — 81003 URINALYSIS AUTO W/O SCOPE: CPT | Mod: QW

## 2022-07-25 NOTE — ASSESSMENT
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\par -On Eliquis bid> preventive\par -F/up with Hematology, Dr Reich at [facility]\par -CT scan done 01/20> internal resolution of PE\par -Seen by Pulmonology, Dr Finn\par \par Arm Neuropathy:\par -seen by neurology at [facility]\par -On Lyrica\par \par Dyslipidemia/ HTN\par -On Amlodipine and Losartan.\par -On crestor\par -F/up with cardiology at [facility]\par \par GERD ;\par -on omeprazole\par \par BPH/ GRoss hematuria x 2> secondary to Eliquis?\par -CT normal.\par -S/P Cystoscopy x 2\par -Schedule for 3rd cystoscopy with bxp 07/15/22> needs clearance.\par -on Tamsulosin\par -F/up with urology, Dr Mooney\par \par Anxiety> now under counseling treatment.\par Colonoscopy: 2018/ repeat q 5 years\par Immunizations: up to date.\par \par  \par

## 2022-07-25 NOTE — REVIEW OF SYSTEMS
[Back Pain] : back pain [Dysuria] : no dysuria [Incontinence] : no incontinence [Hesitancy] : no hesitancy [Nocturia] : no nocturia [Hematuria] : no hematuria [Frequency] : no frequency

## 2022-07-25 NOTE — HEALTH RISK ASSESSMENT
[Never] : Never [No] : In the past 12 months have you used drugs other than those required for medical reasons? No [de-identified] : cardiology, Urology

## 2022-07-25 NOTE — HISTORY OF PRESENT ILLNESS
[Spouse] : spouse [FreeTextEntry8] : Pt presents today for low back pain for 4 days in left lower back. States he apply ice with mild benefits and had tylenol. States pain got worse yesterday with radiation to left lower abdomen. States pain now is 2/10. states pain is like pressure.\par he reports similar pain in the past. Pt denies  urinary symptoms.\par he has hx UTI in the past with recent cystoscopy.\par Medical hx significant for  PE in 9/29/19 on Eliquis. Seen by Hematology Dr Reich at [facility]. he states is still on Eliquis for prevention.\par Neuropathy on Lyrica taper down with Neurology.\par BPH on tamsulosin and Dutasteride, seen by urology, Dyslipidemia on crestor, GERD on omeprazole.\par Seen by Urology for evaluation of Gross hematuria. S/P normal CT and 3 cystoscopies normal.\par Seen by Cardiology at [facility], Dr Sal, and hematology at [facility]..

## 2022-08-30 ENCOUNTER — APPOINTMENT (OUTPATIENT)
Dept: UROLOGY | Facility: CLINIC | Age: 72
End: 2022-08-30

## 2022-09-06 ENCOUNTER — NON-APPOINTMENT (OUTPATIENT)
Age: 72
End: 2022-09-06

## 2022-09-06 ENCOUNTER — APPOINTMENT (OUTPATIENT)
Dept: OPHTHALMOLOGY | Facility: CLINIC | Age: 72
End: 2022-09-06

## 2022-09-06 PROCEDURE — 92014 COMPRE OPH EXAM EST PT 1/>: CPT

## 2022-10-04 ENCOUNTER — APPOINTMENT (OUTPATIENT)
Dept: FAMILY MEDICINE | Facility: CLINIC | Age: 72
End: 2022-10-04

## 2022-10-04 ENCOUNTER — TRANSCRIPTION ENCOUNTER (OUTPATIENT)
Age: 72
End: 2022-10-04

## 2022-10-04 VITALS
BODY MASS INDEX: 30.36 KG/M2 | HEART RATE: 74 BPM | OXYGEN SATURATION: 98 % | TEMPERATURE: 97.2 F | WEIGHT: 205 LBS | RESPIRATION RATE: 16 BRPM | HEIGHT: 69 IN | SYSTOLIC BLOOD PRESSURE: 132 MMHG | DIASTOLIC BLOOD PRESSURE: 78 MMHG

## 2022-10-04 DIAGNOSIS — H81.11 BENIGN PAROXYSMAL VERTIGO, RIGHT EAR: ICD-10-CM

## 2022-10-04 PROCEDURE — 99214 OFFICE O/P EST MOD 30 MIN: CPT

## 2022-10-04 NOTE — HEALTH RISK ASSESSMENT
[Never] : Never [Yes] : Yes [4 or more  times a week (4 pts)] : 4 or more  times a week (4 points) [1 or 2 (0 pts)] : 1 or 2 (0 points) [Never (0 pts)] : Never (0 points)

## 2022-10-04 NOTE — ASSESSMENT
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\par -On Eliquis bid> preventive\par -F/up with Hematology, Dr Reich at [facility]\par -CT scan done 01/20> internal resolution of PE\par -Seen by Pulmonology, Dr Finn\par \par Arm Neuropathy:\par -seen by neurology at [facility]\par -On Lyrica\par \par Dyslipidemia/ HTN\par -On Amlodipine and Losartan.\par -On crestor\par -F/up with cardiology at [facility]\par \par GERD ;\par -on omeprazole\par \par BPH/ GRoss hematuria x 2> secondary to Eliquis?\par -CT normal.\par -S/P Cystoscopy x 2\par -Schedule for 3rd cystoscopy with bxp 07/15/22> needs clearance.\par -on Tamsulosin\par -F/up with urology, Dr Mooney\par \par Anxiety> now under counseling treatment.\par Colonoscopy: 2018/ repeat q 5 years\par Immunizations: up to date.\par \par  \par  \par \par  \par

## 2022-10-04 NOTE — HISTORY OF PRESENT ILLNESS
[FreeTextEntry8] : Pt presents today for evaluation of vertigo. he reports 3 days ago, had an episode of room spinning around. States he feels like spinning with head rotation. he reports similar episodes in the past.\par He states is having stress due to sons in personal conflicts.\par Medical hx significant for PE in 9/29/19 on Eliquis. Seen by Hematology Dr Reich at [facility]. he states is still on Eliquis for prevention.\par Neuropathy on Lyrica taper down with Neurology.\par BPH on tamsulosin and Dutasteride, seen by urology, Dyslipidemia on crestor, GERD on omeprazole.\par Seen by Urology for evaluation of Gross hematuria. S/P normal CT and 3 cystoscopies normal.\par Seen by Cardiology at [facility], Dr Sal, and hematology at [facility].. \par Pt seen by Counselor for Anxiety and family issues.\par Patient accompanied by spouse. \par  \par

## 2022-10-06 ENCOUNTER — NON-APPOINTMENT (OUTPATIENT)
Age: 72
End: 2022-10-06

## 2022-10-13 ENCOUNTER — RX RENEWAL (OUTPATIENT)
Age: 72
End: 2022-10-13

## 2022-11-03 ENCOUNTER — NON-APPOINTMENT (OUTPATIENT)
Age: 72
End: 2022-11-03

## 2022-11-03 ENCOUNTER — APPOINTMENT (OUTPATIENT)
Dept: CARDIOLOGY | Facility: CLINIC | Age: 72
End: 2022-11-03

## 2022-11-03 VITALS
SYSTOLIC BLOOD PRESSURE: 120 MMHG | TEMPERATURE: 97.8 F | DIASTOLIC BLOOD PRESSURE: 56 MMHG | WEIGHT: 204 LBS | HEIGHT: 69 IN | BODY MASS INDEX: 30.21 KG/M2 | HEART RATE: 64 BPM | OXYGEN SATURATION: 95 %

## 2022-11-03 DIAGNOSIS — Z13.6 ENCOUNTER FOR SCREENING FOR CARDIOVASCULAR DISORDERS: ICD-10-CM

## 2022-11-03 DIAGNOSIS — I82.409 ACUTE EMBOLISM AND THROMBOSIS OF UNSPECIFIED DEEP VEINS OF UNSPECIFIED LOWER EXTREMITY: ICD-10-CM

## 2022-11-03 DIAGNOSIS — I26.02 SADDLE EMBOLUS OF PULMONARY ARTERY WITH ACUTE COR PULMONALE: ICD-10-CM

## 2022-11-03 PROCEDURE — 99214 OFFICE O/P EST MOD 30 MIN: CPT

## 2022-11-03 PROCEDURE — 93000 ELECTROCARDIOGRAM COMPLETE: CPT

## 2022-11-03 RX ORDER — TADALAFIL 20 MG/1
20 TABLET ORAL
Qty: 6 | Refills: 3 | Status: DISCONTINUED | COMMUNITY
Start: 2021-08-31 | End: 2022-11-03

## 2022-11-03 RX ORDER — CYCLOBENZAPRINE HYDROCHLORIDE 5 MG/1
5 TABLET, FILM COATED ORAL
Qty: 20 | Refills: 0 | Status: DISCONTINUED | COMMUNITY
Start: 2022-07-25 | End: 2022-11-03

## 2022-11-03 RX ORDER — CICLOPIROX 80 MG/ML
8 SOLUTION TOPICAL
Qty: 7 | Refills: 0 | Status: DISCONTINUED | COMMUNITY
Start: 2022-06-06

## 2022-11-03 RX ORDER — AMOXICILLIN 500 MG/1
500 CAPSULE ORAL
Qty: 21 | Refills: 0 | Status: DISCONTINUED | COMMUNITY
Start: 2022-10-25

## 2022-11-03 RX ORDER — MECLIZINE HYDROCHLORIDE 12.5 MG/1
12.5 TABLET ORAL 3 TIMES DAILY
Qty: 30 | Refills: 3 | Status: DISCONTINUED | COMMUNITY
Start: 2022-10-04 | End: 2022-11-03

## 2022-11-09 ENCOUNTER — OUTPATIENT (OUTPATIENT)
Dept: OUTPATIENT SERVICES | Facility: HOSPITAL | Age: 72
LOS: 1 days | End: 2022-11-09
Payer: SELF-PAY

## 2022-11-09 ENCOUNTER — APPOINTMENT (OUTPATIENT)
Dept: CT IMAGING | Facility: CLINIC | Age: 72
End: 2022-11-09

## 2022-11-09 DIAGNOSIS — Z00.8 ENCOUNTER FOR OTHER GENERAL EXAMINATION: ICD-10-CM

## 2022-11-09 PROCEDURE — 75571 CT HRT W/O DYE W/CA TEST: CPT | Mod: 26

## 2022-11-09 PROCEDURE — 75571 CT HRT W/O DYE W/CA TEST: CPT

## 2022-11-11 ENCOUNTER — RX RENEWAL (OUTPATIENT)
Age: 72
End: 2022-11-11

## 2022-11-14 ENCOUNTER — APPOINTMENT (OUTPATIENT)
Dept: CARDIOLOGY | Facility: CLINIC | Age: 72
End: 2022-11-14

## 2022-11-14 PROCEDURE — 93306 TTE W/DOPPLER COMPLETE: CPT

## 2022-11-27 ENCOUNTER — RX RENEWAL (OUTPATIENT)
Age: 72
End: 2022-11-27

## 2022-11-29 ENCOUNTER — NON-APPOINTMENT (OUTPATIENT)
Age: 72
End: 2022-11-29

## 2022-12-05 ENCOUNTER — TRANSCRIPTION ENCOUNTER (OUTPATIENT)
Age: 72
End: 2022-12-05

## 2023-02-17 ENCOUNTER — APPOINTMENT (OUTPATIENT)
Dept: DERMATOLOGY | Facility: CLINIC | Age: 73
End: 2023-02-17
Payer: MEDICARE

## 2023-02-17 PROCEDURE — 99203 OFFICE O/P NEW LOW 30 MIN: CPT

## 2023-02-25 ENCOUNTER — RX RENEWAL (OUTPATIENT)
Age: 73
End: 2023-02-25

## 2023-03-15 DIAGNOSIS — R30.0 DYSURIA: ICD-10-CM

## 2023-03-17 ENCOUNTER — APPOINTMENT (OUTPATIENT)
Dept: DERMATOLOGY | Facility: CLINIC | Age: 73
End: 2023-03-17

## 2023-03-28 ENCOUNTER — APPOINTMENT (OUTPATIENT)
Dept: UROLOGY | Facility: CLINIC | Age: 73
End: 2023-03-28
Payer: MEDICARE

## 2023-03-28 VITALS
HEIGHT: 70 IN | BODY MASS INDEX: 28.63 KG/M2 | SYSTOLIC BLOOD PRESSURE: 149 MMHG | RESPIRATION RATE: 17 BRPM | HEART RATE: 66 BPM | WEIGHT: 200 LBS | DIASTOLIC BLOOD PRESSURE: 84 MMHG

## 2023-03-28 PROCEDURE — 51798 US URINE CAPACITY MEASURE: CPT

## 2023-03-28 PROCEDURE — 99214 OFFICE O/P EST MOD 30 MIN: CPT

## 2023-03-28 NOTE — ASSESSMENT
[FreeTextEntry1] : Patient is a 73 yo M with BPH/LUTS.\par \par Recent UTI - last UTI was 1 yr ago.  He had cysto in 2022 and CTU in 12/2021.\par D/w pt that he is on max BPH therapy.  D/w pt that alternative option is surgical intervention.  He still does not want to pursue surgery.  He will d/w his wife further\par PVR today acceptable\par Repeat UA/cx today\par For ED, d/w pt alternative is cialis to viagra.  He had tried cialis in the past without visual changes.\par Rx for cialis sent, SE profile, proper use d/w pt \par \par F/u 6 mos

## 2023-03-28 NOTE — HISTORY OF PRESENT ILLNESS
[FreeTextEntry1] : Patient is a 72 yo M who presents for f/u with BPH.\par \par PRIOR hx: Pt is a former pt of Dr Chavis's, however due to insurance is now transferring care to me. He has a longstanding h/o BPH and elevated PSA. He had been on alpha blockers before but did not feel they were helpful and stopped Rapaflo due to ejaculatory side effects. He was very bothered by ejaculatory side effects from Rapaflo, however he feels that many yrs when he tried flomax he did not have as significant ejaculatory side effects.. From Dr Chavis, His prostate is @90cc in volume and in 2015 PSA 4.4 with 18% free. \par \par Pt was previously was other regimens for BPH - rapaflo and finasteride, but experienced increased pain in perineum/pelvis, particularly with ejaculation on rapaflo and finasteride. Did not have this on flomax bid/avodart. But had dizziness on bid flomax.\par \par He then switched to flomax once daily and avodart. \par \par PSA in 7/2017 is 1.2 (on dutasteride).\par PSA in 7/2018 is 1.1 (on dutasteride)\par PSA in 8/2019 is 1.13 (on dutasteride)\par PSA 0.9 in 7/2020 (on dutasteride with PCP)\par \par At baseline good stream except first void, good emptying. Improved frequency. Nocturia x1-2. No dysuria or hematuria.\par \par He reports more difficulty obtaining an erection. He reports 4/10 rigidity with viagra. He feels normal good libido and desire. He was taking 50 mg viagra in the past, but more recently took 100mg viagra and had blue visual changes. He does orgasm and issue more is penetration. No nitrates, no CP or SOB. He did see an eye doctor and told no retina issues.\par \par Pt is currently on Eliquis for hx of PE, dose has lowered.\par \par Interval hx:\par He had gross hematuria in 12/2021.\par Underwent cystoscopy and CTU that were unrevealing also there was evidence of inflammation on cysto.\par \par He comes in today for recurrent gross hematuria. He developed mild burning/dysuria sensation and then noted gross hematuria yesterday. Resolved after 1 day and drinking lots of water. Overnight did void ~20 times but drank ?gallon. Denies fever/chills, flank pain or other change in health. \par \par 3/28/23\par Patient presents today for follow up. Patient recently had urinary tract infection with positive E. Coli, treated with Bactrim, symptom improved, but still very slightly burning sensation occasionally. \par Patient reports urinary frequency varied based on fluids intake, nocturia x 3-4, No gross hematuria, dysuria, straining, or incontinence. He feels empty well. \par Remains on flomax/avodart.\par \par ED:\par He reports seeing blue vision while taking Viagra. He would like to try Cialis, with some improvement, 5/10 rigidity, but some difficulty of penetration. \par He still has strong libido. \par Denies CP, nitrate use.

## 2023-03-28 NOTE — PHYSICAL EXAM
[Normal Appearance] : normal appearance [Abdomen Soft] : soft [Abdomen Tenderness] : non-tender [Edema] : no peripheral edema [] : no respiratory distress [Exaggerated Use Of Accessory Muscles For Inspiration] : no accessory muscle use [Oriented To Time, Place, And Person] : oriented to person, place, and time [No Focal Deficits] : no focal deficits [Urinary Bladder Findings] : the bladder was normal on palpation

## 2023-03-30 ENCOUNTER — APPOINTMENT (OUTPATIENT)
Age: 73
End: 2023-03-30

## 2023-03-30 LAB
APPEARANCE: CLEAR
BACTERIA: NEGATIVE
BILIRUBIN URINE: NEGATIVE
BLOOD URINE: NEGATIVE
COLOR: NORMAL
GLUCOSE QUALITATIVE U: NEGATIVE
HYALINE CASTS: 0 /LPF
KETONES URINE: NEGATIVE
LEUKOCYTE ESTERASE URINE: NEGATIVE
MICROSCOPIC-UA: NORMAL
NITRITE URINE: NEGATIVE
PH URINE: 5.5
PROTEIN URINE: NORMAL
RED BLOOD CELLS URINE: 1 /HPF
SPECIFIC GRAVITY URINE: 1.02
SQUAMOUS EPITHELIAL CELLS: 0 /HPF
UROBILINOGEN URINE: NORMAL
WHITE BLOOD CELLS URINE: 1 /HPF

## 2023-04-02 ENCOUNTER — RESULT REVIEW (OUTPATIENT)
Age: 73
End: 2023-04-02

## 2023-04-03 ENCOUNTER — APPOINTMENT (OUTPATIENT)
Dept: DERMATOLOGY | Facility: CLINIC | Age: 73
End: 2023-04-03
Payer: MEDICARE

## 2023-04-03 DIAGNOSIS — L72.0 EPIDERMAL CYST: ICD-10-CM

## 2023-04-03 PROCEDURE — 11402 EXC TR-EXT B9+MARG 1.1-2 CM: CPT

## 2023-04-03 PROCEDURE — 12032 INTMD RPR S/A/T/EXT 2.6-7.5: CPT

## 2023-04-14 ENCOUNTER — NON-APPOINTMENT (OUTPATIENT)
Age: 73
End: 2023-04-14

## 2023-04-14 ENCOUNTER — APPOINTMENT (OUTPATIENT)
Age: 73
End: 2023-04-14

## 2023-04-14 ENCOUNTER — APPOINTMENT (OUTPATIENT)
Dept: UROLOGY | Facility: CLINIC | Age: 73
End: 2023-04-14
Payer: MEDICARE

## 2023-04-14 DIAGNOSIS — N30.00 ACUTE CYSTITIS W/OUT HEMATURIA: ICD-10-CM

## 2023-04-14 LAB
BILIRUB UR QL STRIP: NEGATIVE
CLARITY UR: NORMAL
COLLECTION METHOD: NORMAL
GLUCOSE UR-MCNC: NEGATIVE
HCG UR QL: 0.2 EU/DL
HGB UR QL STRIP.AUTO: NORMAL
KETONES UR-MCNC: NEGATIVE
LEUKOCYTE ESTERASE UR QL STRIP: NORMAL
NITRITE UR QL STRIP: NEGATIVE
PH UR STRIP: 5
PROT UR STRIP-MCNC: NEGATIVE
SP GR UR STRIP: 1.01

## 2023-04-14 PROCEDURE — 99214 OFFICE O/P EST MOD 30 MIN: CPT

## 2023-04-14 PROCEDURE — 51798 US URINE CAPACITY MEASURE: CPT

## 2023-04-14 NOTE — ASSESSMENT
[FreeTextEntry1] : Patient is a 73 yo M with BPH/LUTS and recently more frequent UTIs.\par \par He had cysto in 2022 and CTU in 12/2021.  Only BPH noted.\par D/w pt that he is on max BPH therapy.  D/w pt that alternative option is surgical intervention. D/w pt that given his LUTS are worsening and more frequent UTI lately, suggest he consider more strongly surgical procedures.\par PVR today\par Repeat UA/cx today\par Cipro\par \par For ED, he has not taken cialis yet, but has tried in the past without visual changes.\par \par He is open to BPH surgery - will refer to Dr Salazar \par

## 2023-04-14 NOTE — PHYSICAL EXAM
[General Appearance - Well Developed] : well developed [General Appearance - Well Nourished] : well nourished [Normal Appearance] : normal appearance [Well Groomed] : well groomed [General Appearance - In No Acute Distress] : no acute distress [Urinary Bladder Findings] : the bladder was normal on palpation [Abdomen Soft] : soft [Abdomen Tenderness] : non-tender

## 2023-04-14 NOTE — HISTORY OF PRESENT ILLNESS
[FreeTextEntry1] : Patient is a 71 yo M who presents for f/u with BPH.\par \par PRIOR hx: Pt is a former pt of Dr Chavis's, however due to insurance is now transferring care to me. He has a longstanding h/o BPH and elevated PSA. He had been on alpha blockers before but did not feel they were helpful and stopped Rapaflo due to ejaculatory side effects. He was very bothered by ejaculatory side effects from Rapaflo, however he feels that many yrs when he tried flomax he did not have as significant ejaculatory side effects.. From Dr Chavis, His prostate is @90cc in volume and in 2015 PSA 4.4 with 18% free. \par \par Pt was previously was other regimens for BPH - rapaflo and finasteride, but experienced increased pain in perineum/pelvis, particularly with ejaculation on rapaflo and finasteride. Did not have this on flomax bid/avodart. But had dizziness on bid flomax.\par \par He then switched to flomax once daily and avodart. \par \par PSA in 7/2017 is 1.2 (on dutasteride).\par PSA in 7/2018 is 1.1 (on dutasteride)\par PSA in 8/2019 is 1.13 (on dutasteride)\par PSA 0.9 in 7/2020 (on dutasteride with PCP)\par \par At baseline good stream except first void, good emptying. Improved frequency. Nocturia x1-2. No dysuria or hematuria.\par \par He reports more difficulty obtaining an erection. He reports 4/10 rigidity with viagra. He feels normal good libido and desire. He was taking 50 mg viagra in the past, but more recently took 100mg viagra and had blue visual changes. He does orgasm and issue more is penetration. No nitrates, no CP or SOB. He did see an eye doctor and told no retina issues.\par \par Pt is currently on Eliquis for hx of PE, dose has lowered.\par \par Interval hx:\par He had gross hematuria in 12/2021.\par Underwent cystoscopy and CTU that were unrevealing also there was evidence of inflammation on cysto.\par \par Over the past year he has had several episodes of cystitis with and without hematuria. \par Most recently he presented a few weeks ago on 3/28/23 for follow up. Patient recently had urinary tract infection with positive E. Coli, treated with Bactrim, symptom improved, but still very slightly burning sensation occasionally. \par Patient reports urinary frequency varied based on fluids intake, nocturia x 3-4, No gross hematuria, dysuria, straining, or incontinence. He feels empty well. \par Repeat ucx neg on 3/28\par Remains on flomax/avodart.\par \par Now he is coming in today for 1 day of dysuria, freq and cloudy urine. No fever/chills, blood or retention.\par \par ED:\par He reports seeing blue vision while taking Viagra. He would like to try Cialis, with some improvement, 5/10 rigidity, but some difficulty of penetration. \par He still have strong libido.

## 2023-04-24 ENCOUNTER — OUTPATIENT (OUTPATIENT)
Dept: OUTPATIENT SERVICES | Facility: HOSPITAL | Age: 73
LOS: 1 days | End: 2023-04-24
Payer: MEDICARE

## 2023-04-24 ENCOUNTER — APPOINTMENT (OUTPATIENT)
Dept: ULTRASOUND IMAGING | Facility: CLINIC | Age: 73
End: 2023-04-24
Payer: MEDICARE

## 2023-04-24 DIAGNOSIS — N39.0 URINARY TRACT INFECTION, SITE NOT SPECIFIED: ICD-10-CM

## 2023-04-24 PROCEDURE — 76775 US EXAM ABDO BACK WALL LIM: CPT

## 2023-04-24 PROCEDURE — 76775 US EXAM ABDO BACK WALL LIM: CPT | Mod: 26

## 2023-04-25 ENCOUNTER — APPOINTMENT (OUTPATIENT)
Age: 73
End: 2023-04-25

## 2023-04-26 ENCOUNTER — RX RENEWAL (OUTPATIENT)
Age: 73
End: 2023-04-26

## 2023-05-03 ENCOUNTER — APPOINTMENT (OUTPATIENT)
Dept: FAMILY MEDICINE | Facility: CLINIC | Age: 73
End: 2023-05-03

## 2023-05-08 ENCOUNTER — APPOINTMENT (OUTPATIENT)
Dept: UROLOGY | Facility: CLINIC | Age: 73
End: 2023-05-08
Payer: MEDICARE

## 2023-05-08 VITALS
HEART RATE: 69 BPM | DIASTOLIC BLOOD PRESSURE: 78 MMHG | SYSTOLIC BLOOD PRESSURE: 126 MMHG | HEIGHT: 70 IN | RESPIRATION RATE: 17 BRPM | TEMPERATURE: 98.3 F

## 2023-05-08 DIAGNOSIS — R97.20 ELEVATED PROSTATE, SPECIFIC ANTIGEN [PSA]: ICD-10-CM

## 2023-05-08 DIAGNOSIS — N39.0 URINARY TRACT INFECTION, SITE NOT SPECIFIED: ICD-10-CM

## 2023-05-08 PROCEDURE — 99214 OFFICE O/P EST MOD 30 MIN: CPT

## 2023-05-08 NOTE — HISTORY OF PRESENT ILLNESS
[FreeTextEntry1] : see notes from Dr. Mooney\par Referred by Dr. Mooney\par \par Chart reviewed, labs, CT (2021), ultrasound (2023) reviewed: Prostate volume 72 cc \par PSA 0.87\par Two bouts of E coli UTI\par Referred to discuss Laser enucleation of prostate

## 2023-05-08 NOTE — ASSESSMENT
[FreeTextEntry1] : Discussed Laser enucleation of prostate with morcellation (HOLEP/ThuLEP).\par \par Indications, options including chronic De La Garza catheter, suprapubic catheter, intermittent self-catheterization, transurethral resection of prostate, transurethral vaporization of prostate with laser or electrocautery, open or laparoscopic enucleation of the prostate, all discussed.\par \par Potential complications of transurethral holmium or thulium laser enucleation of prostate with morcellation discussed. This included risk of infection, bleeding, transfusion, conversion to open enucleation, need for staged procedure, adjacent organ injury, bladder injury, clot retention of urine requiring return to operating room for cystoscopy clot evacuation, prolonged duration of De La Garza catheterization, urethral stricture, transient or permanent urinary incontinence, retrograde ejaculation is a permanent and irreversible complication, redo or staged surgery due to equipment malfunction, anesthetic complications, cardiac complications, all discussed. \par \par Printed information including of the above and other more uncommon complications provided to patient.\par He will call to schedule if he wishes to proceed with surgery\par \par Urine culture sent

## 2023-05-08 NOTE — PHYSICAL EXAM
[General Appearance - Well Nourished] : well nourished [General Appearance - Well Developed] : well developed [Normal Appearance] : normal appearance [Well Groomed] : well groomed [General Appearance - In No Acute Distress] : no acute distress [Skin Color & Pigmentation] : normal skin color and pigmentation [] : no respiratory distress [Respiration, Rhythm And Depth] : normal respiratory rhythm and effort [Exaggerated Use Of Accessory Muscles For Inspiration] : no accessory muscle use [Oriented To Time, Place, And Person] : oriented to person, place, and time [Not Anxious] : not anxious [Normal Station and Gait] : the gait and station were normal for the patient's age

## 2023-05-11 ENCOUNTER — APPOINTMENT (OUTPATIENT)
Age: 73
End: 2023-05-11

## 2023-05-11 LAB
APPEARANCE: CLEAR
BACTERIA: NEGATIVE /HPF
BILIRUBIN URINE: NEGATIVE
BLOOD URINE: NEGATIVE
CAST: 0 /LPF
COLOR: YELLOW
EPITHELIAL CELLS: 0 /HPF
GLUCOSE QUALITATIVE U: NEGATIVE MG/DL
KETONES URINE: NEGATIVE MG/DL
LEUKOCYTE ESTERASE URINE: NEGATIVE
MICROSCOPIC-UA: NORMAL
NITRITE URINE: NEGATIVE
PH URINE: 6
PROTEIN URINE: NEGATIVE MG/DL
RED BLOOD CELLS URINE: 0 /HPF
SPECIFIC GRAVITY URINE: 1.01
UROBILINOGEN URINE: 0.2 MG/DL
WHITE BLOOD CELLS URINE: 0 /HPF

## 2023-05-25 ENCOUNTER — APPOINTMENT (OUTPATIENT)
Dept: FAMILY MEDICINE | Facility: CLINIC | Age: 73
End: 2023-05-25
Payer: MEDICARE

## 2023-05-25 VITALS
RESPIRATION RATE: 14 BRPM | OXYGEN SATURATION: 97 % | HEART RATE: 80 BPM | HEIGHT: 70 IN | TEMPERATURE: 97.6 F | WEIGHT: 203 LBS | DIASTOLIC BLOOD PRESSURE: 80 MMHG | SYSTOLIC BLOOD PRESSURE: 130 MMHG | BODY MASS INDEX: 29.06 KG/M2

## 2023-05-25 DIAGNOSIS — F41.9 ANXIETY DISORDER, UNSPECIFIED: ICD-10-CM

## 2023-05-25 DIAGNOSIS — N40.0 BENIGN PROSTATIC HYPERPLASIA WITHOUT LOWER URINARY TRACT SYMPMS: ICD-10-CM

## 2023-05-25 PROCEDURE — 99214 OFFICE O/P EST MOD 30 MIN: CPT | Mod: 25

## 2023-05-25 PROCEDURE — 36415 COLL VENOUS BLD VENIPUNCTURE: CPT

## 2023-05-25 RX ORDER — SULFAMETHOXAZOLE AND TRIMETHOPRIM 800; 160 MG/1; MG/1
800-160 TABLET ORAL TWICE DAILY
Qty: 14 | Refills: 0 | Status: DISCONTINUED | COMMUNITY
Start: 2023-03-15 | End: 2023-05-25

## 2023-05-25 RX ORDER — CIPROFLOXACIN HYDROCHLORIDE 500 MG/1
500 TABLET, FILM COATED ORAL TWICE DAILY
Qty: 14 | Refills: 0 | Status: DISCONTINUED | COMMUNITY
Start: 2023-04-14 | End: 2023-05-25

## 2023-05-25 RX ORDER — PHENAZOPYRIDINE HYDROCHLORIDE 100 MG/1
100 TABLET ORAL 3 TIMES DAILY
Qty: 9 | Refills: 0 | Status: DISCONTINUED | COMMUNITY
Start: 2023-03-15 | End: 2023-05-25

## 2023-05-25 NOTE — HISTORY OF PRESENT ILLNESS
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 Seen by Hematology Dr Reich at [facility]. he states is still on Eliquis for prevention.\par Neuropathy on Lyrica taper down with Neurology.\par BPH on tamsulosin and Dutasteride, seen by urology, Dyslipidemia on crestor, GERD on omeprazole.\par Seen by Urology for evaluation of Gross hematuria. S/P normal CT and 2 cystoscopies normal.\par Recent UTI treated by urology.\par Seen by Cardiology at [facility], Dr Sal, and hematology at [facility]..\par He states is now seen by Counseling for Anxiety and is working very well,\par

## 2023-05-25 NOTE — HEALTH RISK ASSESSMENT
[Yes] : Yes [No] : In the past 12 months have you used drugs other than those required for medical reasons? No [Never] : Never [No falls in past year] : Patient reported no falls in the past year [0] : 2) Feeling down, depressed, or hopeless: Not at all (0) [PHQ-2 Negative - No further assessment needed] : PHQ-2 Negative - No further assessment needed [de-identified] : UROLOGY [[id number]] : 0

## 2023-05-25 NOTE — ASSESSMENT
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\par -On Eliquis bid> preventive?> Med risks and benefits d/w pt.\par -F/up with Hematology, Dr Reich at [facility]\par -CT scan done 01/20> internal resolution of PE\par -Seen by Pulmonology, Dr Finn\par \par Arm Neuropathy:\par -seen by neurology at [facility]\par -On Lyrica\par \par Dyslipidemia/ HTN\par -On Amlodipine and Losartan.\par -On crestor\par -F/up with cardiology at [facility]\par \par GERD ;\par -on omeprazole\par \par BPH/ GRoss hematuria x 2> secondary to Eliquis?\par -CT normal.\par -S/P Cystoscopy x 2\par -on Tamsulosin\par -F/up with urology, Dr Mooney\par \par Anxiety> now under counseling treatment.\par Blood and UA today\par Colonoscopy: 2018/ repeat q 5 years> advise to f/up with GI\par Immunizations: up to date.\par \par

## 2023-05-26 ENCOUNTER — RX RENEWAL (OUTPATIENT)
Age: 73
End: 2023-05-26

## 2023-05-31 LAB
CHOLEST SERPL-MCNC: 149 MG/DL
COMPREHENSIVE SCREEN URINE: NORMAL
FOLATE SERPL-MCNC: 5.4 NG/ML
HDLC SERPL-MCNC: 75 MG/DL
LDLC SERPL CALC-MCNC: 57 MG/DL
NONHDLC SERPL-MCNC: 74 MG/DL
TRIGL SERPL-MCNC: 84 MG/DL
TSH SERPL-ACNC: 1.84 UIU/ML
VIT B12 SERPL-MCNC: 448 PG/ML

## 2023-06-29 ENCOUNTER — RX RENEWAL (OUTPATIENT)
Age: 73
End: 2023-06-29

## 2023-07-14 ENCOUNTER — APPOINTMENT (OUTPATIENT)
Dept: UROLOGY | Facility: CLINIC | Age: 73
End: 2023-07-14

## 2023-07-31 ENCOUNTER — APPOINTMENT (OUTPATIENT)
Dept: UROLOGY | Facility: CLINIC | Age: 73
End: 2023-07-31

## 2023-08-11 ENCOUNTER — RX RENEWAL (OUTPATIENT)
Age: 73
End: 2023-08-11

## 2023-08-24 ENCOUNTER — RX RENEWAL (OUTPATIENT)
Age: 73
End: 2023-08-24

## 2023-10-02 ENCOUNTER — NON-APPOINTMENT (OUTPATIENT)
Age: 73
End: 2023-10-02

## 2023-10-02 ENCOUNTER — APPOINTMENT (OUTPATIENT)
Dept: OPHTHALMOLOGY | Facility: CLINIC | Age: 73
End: 2023-10-02
Payer: MEDICARE

## 2023-10-02 PROCEDURE — 92014 COMPRE OPH EXAM EST PT 1/>: CPT

## 2023-10-03 ENCOUNTER — APPOINTMENT (OUTPATIENT)
Dept: UROLOGY | Facility: CLINIC | Age: 73
End: 2023-10-03
Payer: MEDICARE

## 2023-10-03 VITALS
WEIGHT: 203 LBS | DIASTOLIC BLOOD PRESSURE: 78 MMHG | SYSTOLIC BLOOD PRESSURE: 120 MMHG | HEART RATE: 64 BPM | BODY MASS INDEX: 29.06 KG/M2 | HEIGHT: 70 IN

## 2023-10-03 DIAGNOSIS — N52.1 ERECTILE DYSFUNCTION DUE TO DISEASES CLASSIFIED ELSEWHERE: ICD-10-CM

## 2023-10-03 PROCEDURE — 99213 OFFICE O/P EST LOW 20 MIN: CPT

## 2023-10-03 PROCEDURE — 51798 US URINE CAPACITY MEASURE: CPT

## 2023-10-05 ENCOUNTER — APPOINTMENT (OUTPATIENT)
Age: 73
End: 2023-10-05

## 2023-10-05 LAB — PSA SERPL-MCNC: 0.91 NG/ML

## 2023-10-16 ENCOUNTER — APPOINTMENT (OUTPATIENT)
Dept: FAMILY MEDICINE | Facility: CLINIC | Age: 73
End: 2023-10-16
Payer: MEDICARE

## 2023-10-16 VITALS
OXYGEN SATURATION: 97 % | HEIGHT: 70 IN | HEART RATE: 58 BPM | TEMPERATURE: 97.6 F | WEIGHT: 205 LBS | SYSTOLIC BLOOD PRESSURE: 126 MMHG | BODY MASS INDEX: 29.35 KG/M2 | DIASTOLIC BLOOD PRESSURE: 70 MMHG | RESPIRATION RATE: 15 BRPM

## 2023-10-16 DIAGNOSIS — Z00.00 ENCOUNTER FOR GENERAL ADULT MEDICAL EXAMINATION W/OUT ABNORMAL FINDINGS: ICD-10-CM

## 2023-10-16 DIAGNOSIS — I10 ESSENTIAL (PRIMARY) HYPERTENSION: ICD-10-CM

## 2023-10-16 DIAGNOSIS — M79.18 MYALGIA, OTHER SITE: ICD-10-CM

## 2023-10-16 DIAGNOSIS — I26.99 OTHER PULMONARY EMBOLISM W/OUT ACUTE COR PULMONALE: ICD-10-CM

## 2023-10-16 DIAGNOSIS — E78.5 HYPERLIPIDEMIA, UNSPECIFIED: ICD-10-CM

## 2023-10-16 DIAGNOSIS — K21.9 GASTRO-ESOPHAGEAL REFLUX DISEASE W/OUT ESOPHAGITIS: ICD-10-CM

## 2023-10-16 PROCEDURE — G0439: CPT

## 2023-10-16 RX ORDER — ROSUVASTATIN CALCIUM 10 MG/1
10 TABLET, FILM COATED ORAL
Qty: 30 | Refills: 0 | Status: DISCONTINUED | COMMUNITY
End: 2023-10-16

## 2023-10-16 RX ORDER — MUPIROCIN 20 MG/G
2 OINTMENT TOPICAL TWICE DAILY
Qty: 1 | Refills: 2 | Status: DISCONTINUED | COMMUNITY
Start: 2023-04-03 | End: 2023-10-16

## 2023-10-16 RX ORDER — ROSUVASTATIN CALCIUM 10 MG/1
10 TABLET, FILM COATED ORAL
Refills: 0 | Status: ACTIVE | COMMUNITY

## 2023-10-16 RX ORDER — EVOLOCUMAB 140 MG/ML
INJECTION, SOLUTION SUBCUTANEOUS
Refills: 0 | Status: ACTIVE | COMMUNITY

## 2023-11-02 ENCOUNTER — APPOINTMENT (OUTPATIENT)
Dept: CARDIOLOGY | Facility: CLINIC | Age: 73
End: 2023-11-02

## 2023-11-22 ENCOUNTER — RX RENEWAL (OUTPATIENT)
Age: 73
End: 2023-11-22

## 2024-02-20 ENCOUNTER — APPOINTMENT (OUTPATIENT)
Dept: DERMATOLOGY | Facility: CLINIC | Age: 74
End: 2024-02-20
Payer: MEDICARE

## 2024-02-20 PROCEDURE — 99214 OFFICE O/P EST MOD 30 MIN: CPT

## 2024-03-11 ENCOUNTER — APPOINTMENT (OUTPATIENT)
Dept: DERMATOLOGY | Facility: CLINIC | Age: 74
End: 2024-03-11
Payer: MEDICARE

## 2024-03-11 PROCEDURE — 99212 OFFICE O/P EST SF 10 MIN: CPT | Mod: 25

## 2024-03-11 PROCEDURE — 17000 DESTRUCT PREMALG LESION: CPT

## 2024-04-09 ENCOUNTER — APPOINTMENT (OUTPATIENT)
Dept: UROLOGY | Facility: CLINIC | Age: 74
End: 2024-04-09
Payer: MEDICARE

## 2024-04-09 VITALS — HEART RATE: 64 BPM | SYSTOLIC BLOOD PRESSURE: 120 MMHG | DIASTOLIC BLOOD PRESSURE: 82 MMHG

## 2024-04-09 DIAGNOSIS — N52.9 MALE ERECTILE DYSFUNCTION, UNSPECIFIED: ICD-10-CM

## 2024-04-09 DIAGNOSIS — N40.1 BENIGN PROSTATIC HYPERPLASIA WITH LOWER URINARY TRACT SYMPMS: ICD-10-CM

## 2024-04-09 DIAGNOSIS — N13.8 BENIGN PROSTATIC HYPERPLASIA WITH LOWER URINARY TRACT SYMPMS: ICD-10-CM

## 2024-04-09 PROCEDURE — G2211 COMPLEX E/M VISIT ADD ON: CPT

## 2024-04-09 PROCEDURE — 99213 OFFICE O/P EST LOW 20 MIN: CPT

## 2024-04-09 PROCEDURE — 51798 US URINE CAPACITY MEASURE: CPT

## 2024-04-09 RX ORDER — TADALAFIL 20 MG/1
20 TABLET ORAL
Qty: 30 | Refills: 3 | Status: ACTIVE | COMMUNITY
Start: 2023-03-28 | End: 1900-01-01

## 2024-04-09 NOTE — ASSESSMENT
[FreeTextEntry1] : Patient is a 72 yo M with BPH/LUTS and ED. H/o UTIs and hematuria associated with cystitis.  He had neg cysto in 2022 and CTU in 12/2021.  Renal sono 4/2023 unremarkable.  He is on max BPH therapy.  He is not interested in pursuing BPH surgery.  PSAs have been very stable and low For ED, cialis working less well.  Discussed with pt treatment options for ED, including oral PDE5-is, injectable medications such as MUSE or ICI, HEATH, and penile prosthesis.  He is not interested in injections or prosthesis.  At this stage of his life he is less concerned about erections.  He still has some cialis at home as well.  He will require longitudinal care for his chronic and complex urologic conditions.  F/u 1 yr

## 2024-04-09 NOTE — PHYSICAL EXAM
[General Appearance - Well Developed] : well developed [General Appearance - Well Nourished] : well nourished [Normal Appearance] : normal appearance [Well Groomed] : well groomed [General Appearance - In No Acute Distress] : no acute distress [] : no respiratory distress [Respiration, Rhythm And Depth] : normal respiratory rhythm and effort [Exaggerated Use Of Accessory Muscles For Inspiration] : no accessory muscle use [Oriented To Time, Place, And Person] : oriented to person, place, and time [Affect] : the affect was normal [Not Anxious] : not anxious

## 2024-04-09 NOTE — HISTORY OF PRESENT ILLNESS
[FreeTextEntry1] : Patient is a 71 yo M who presents for f/u with BPH.  PRIOR hx: Pt is a former pt of Dr Chavis's, however due to insurance is now transferring care to me. He has a longstanding h/o BPH and elevated PSA. He had been on alpha blockers before but did not feel they were helpful and stopped Rapaflo due to ejaculatory side effects. He was very bothered by ejaculatory side effects from Rapaflo, however he feels that many yrs when he tried flomax he did not have as significant ejaculatory side effects.. From Dr Chavis, His prostate is @90cc in volume and in 2015 PSA 4.4 with 18% free.  Pt was previously was other regimens for BPH - rapaflo and finasteride, but experienced increased pain in perineum/pelvis, particularly with ejaculation on rapaflo and finasteride. Did not have this on flomax bid/avodart. But had dizziness on bid flomax.  He then switched to flomax once daily and avodart. PSA in 7/2017 is 1.2 (on dutasteride). PSA in 7/2018 is 1.1 (on dutasteride) PSA in 8/2019 is 1.13 (on dutasteride) PSA 0.9 in 7/2020 (on dutasteride with PCP) PSA 0.8 in 5/2022 (on dutasteride)  He reports more difficulty obtaining an erection. He reports 4/10 rigidity with viagra. He feels normal good libido and desire. He was taking 50 mg viagra in the past, but more recently took 100mg viagra and had blue visual changes. He does orgasm and issue more is penetration. No nitrates, no CP or SOB. He did see an eye doctor and told no retina issues.  Pt is currently on Eliquis for hx of PE, dose has lowered.  Interval hx: He had gross hematuria in 12/2021 but likely 2/2 UTI. Underwent cystoscopy and CTU that were unrevealing also there was evidence of recent inflammation on cysto. Over the past year [id number] he has had several episodes of cystitis with and without hematuria.  He presented on 3/28/23 for follow up. Patient recently had urinary tract infection with positive E. Coli, treated with Bactrim, symptom improved. Patient reports urinary frequency varied based on fluids intake, nocturia x 3-4,  4/2023 - renal sono unremarkable, prostate measured 72cc.  4/2024 He presents for followup. He reports LUTS stable, takes Flomax and dutasteride. No recurrent UTI. No gross hematuria, dysuria, straining, or incontinence. He feels emptying well. He was seen by Dr. Salazar for possible BPH procedures and decided to hold off the BPH procedure currently. He has been taking tadalafil 20 mg with varying response, he feels lately it is working less well. He reports that he only gets semi-erect, not sufficient for penetration. PSA 10/2023 - 0.91

## 2024-04-10 ENCOUNTER — APPOINTMENT (OUTPATIENT)
Age: 74
End: 2024-04-10

## 2024-04-15 NOTE — ED ADULT NURSE REASSESSMENT NOTE - GENERAL PATIENT STATE
Internal Medicine Teaching Service (IMTS)  Discharge Summary   Patient: Yen Edwards Discharge Date: 4/15/2024 [facility] Hospital: [facility]   YOB: 1957 Admission Date: 4/14/2024 [facility] Senior: Josh Gonzalez DO   MRN: [id number] Admission Length: 1 Days [facility] Intern: Pallavi Flores MD    Admitting Physician: Gibran Auguste MD Discharge Physician: Gibran Auguste MD Miriam Sandro Mosquera, CLARISSA  [address] [facility] [address]  [phone]    CMS Best Practices - MI - Gibran Auguste MD  04/15/2024              family/SO at bedside/comfortable appearance

## 2024-05-14 NOTE — DIETITIAN INITIAL EVALUATION ADULT. - WEIGHT IN KG
This is a chronic issue.  Patient had mild elevation of his LDL last testing and is here to have it redone.  He is active and on a very healthy diet.   83.5 86.1

## 2024-05-22 ENCOUNTER — RX RENEWAL (OUTPATIENT)
Age: 74
End: 2024-05-22

## 2024-05-24 ENCOUNTER — TRANSCRIPTION ENCOUNTER (OUTPATIENT)
Age: 74
End: 2024-05-24

## 2024-05-24 RX ORDER — OMEPRAZOLE 40 MG/1
40 CAPSULE, DELAYED RELEASE ORAL
Qty: 90 | Refills: 0 | Status: ACTIVE | COMMUNITY
Start: 2020-12-25 | End: 1900-01-01

## 2024-08-14 ENCOUNTER — RX RENEWAL (OUTPATIENT)
Age: 74
End: 2024-08-14

## 2024-09-25 NOTE — DIETITIAN INITIAL EVALUATION ADULT. - TIME FRAME
Spoke with patient and informed of results below, patient verbalized understanding and has no further questions or concerns at this time.     x ~2 weeks

## 2024-10-15 ENCOUNTER — APPOINTMENT (OUTPATIENT)
Dept: OPHTHALMOLOGY | Facility: CLINIC | Age: 74
End: 2024-10-15
Payer: MEDICARE

## 2024-10-15 ENCOUNTER — NON-APPOINTMENT (OUTPATIENT)
Age: 74
End: 2024-10-15

## 2024-10-15 PROCEDURE — 92014 COMPRE OPH EXAM EST PT 1/>: CPT

## 2024-10-21 ENCOUNTER — APPOINTMENT (OUTPATIENT)
Dept: FAMILY MEDICINE | Facility: CLINIC | Age: 74
End: 2024-10-21
Payer: MEDICARE

## 2024-10-21 VITALS
BODY MASS INDEX: 27.2 KG/M2 | DIASTOLIC BLOOD PRESSURE: 80 MMHG | OXYGEN SATURATION: 98 % | HEIGHT: 70 IN | HEART RATE: 70 BPM | SYSTOLIC BLOOD PRESSURE: 120 MMHG | WEIGHT: 190 LBS | RESPIRATION RATE: 14 BRPM

## 2024-10-21 DIAGNOSIS — Z00.00 ENCOUNTER FOR GENERAL ADULT MEDICAL EXAMINATION W/OUT ABNORMAL FINDINGS: ICD-10-CM

## 2024-10-21 PROCEDURE — 36415 COLL VENOUS BLD VENIPUNCTURE: CPT

## 2024-10-21 PROCEDURE — G0444 DEPRESSION SCREEN ANNUAL: CPT

## 2024-10-21 PROCEDURE — G0439: CPT

## 2024-10-22 LAB
APPEARANCE: CLEAR
BILIRUBIN URINE: NEGATIVE
BLOOD URINE: NEGATIVE
CHOLEST SERPL-MCNC: 92 MG/DL
COLOR: YELLOW
GLUCOSE QUALITATIVE U: NEGATIVE MG/DL
HDLC SERPL-MCNC: 54 MG/DL
KETONES URINE: NEGATIVE MG/DL
LDLC SERPL CALC-MCNC: 20 MG/DL
LEUKOCYTE ESTERASE URINE: NEGATIVE
NITRITE URINE: NEGATIVE
NONHDLC SERPL-MCNC: 38 MG/DL
PH URINE: 6
PROTEIN URINE: NEGATIVE MG/DL
SPECIFIC GRAVITY URINE: 1.01
TRIGL SERPL-MCNC: 94 MG/DL
TSH SERPL-ACNC: 2.09 UIU/ML
UROBILINOGEN URINE: 0.2 MG/DL

## 2024-11-11 ENCOUNTER — RX RENEWAL (OUTPATIENT)
Age: 74
End: 2024-11-11

## 2025-02-03 ENCOUNTER — RX RENEWAL (OUTPATIENT)
Age: 75
End: 2025-02-03

## 2025-02-10 ENCOUNTER — APPOINTMENT (OUTPATIENT)
Dept: DERMATOLOGY | Facility: CLINIC | Age: 75
End: 2025-02-10
Payer: MEDICARE

## 2025-02-10 PROCEDURE — 99213 OFFICE O/P EST LOW 20 MIN: CPT

## 2025-02-19 ENCOUNTER — TRANSCRIPTION ENCOUNTER (OUTPATIENT)
Age: 75
End: 2025-02-19

## 2025-02-24 ENCOUNTER — APPOINTMENT (OUTPATIENT)
Dept: FAMILY MEDICINE | Facility: CLINIC | Age: 75
End: 2025-02-24
Payer: MEDICARE

## 2025-02-24 VITALS
OXYGEN SATURATION: 97 % | HEIGHT: 70 IN | HEART RATE: 85 BPM | RESPIRATION RATE: 14 BRPM | TEMPERATURE: 98.1 F | DIASTOLIC BLOOD PRESSURE: 84 MMHG | WEIGHT: 190 LBS | SYSTOLIC BLOOD PRESSURE: 132 MMHG | BODY MASS INDEX: 27.2 KG/M2

## 2025-02-24 DIAGNOSIS — N40.0 BENIGN PROSTATIC HYPERPLASIA WITHOUT LOWER URINARY TRACT SYMPMS: ICD-10-CM

## 2025-02-24 DIAGNOSIS — N39.0 URINARY TRACT INFECTION, SITE NOT SPECIFIED: ICD-10-CM

## 2025-02-24 PROCEDURE — G2211 COMPLEX E/M VISIT ADD ON: CPT

## 2025-02-24 PROCEDURE — 81003 URINALYSIS AUTO W/O SCOPE: CPT | Mod: QW

## 2025-02-24 PROCEDURE — 99213 OFFICE O/P EST LOW 20 MIN: CPT

## 2025-02-24 RX ORDER — SULFAMETHOXAZOLE AND TRIMETHOPRIM 800; 160 MG/1; MG/1
800-160 TABLET ORAL TWICE DAILY
Qty: 14 | Refills: 0 | Status: ACTIVE | COMMUNITY
Start: 2025-02-24 | End: 1900-01-01

## 2025-02-25 PROBLEM — N39.0 ACUTE UTI: Status: ACTIVE | Noted: 2025-02-24 | Resolved: 2025-03-26

## 2025-02-26 LAB — BACTERIA UR CULT: NORMAL

## 2025-04-15 ENCOUNTER — APPOINTMENT (OUTPATIENT)
Dept: UROLOGY | Facility: CLINIC | Age: 75
End: 2025-04-15
Payer: MEDICARE

## 2025-04-15 VITALS — HEART RATE: 72 BPM | DIASTOLIC BLOOD PRESSURE: 83 MMHG | SYSTOLIC BLOOD PRESSURE: 138 MMHG

## 2025-04-15 DIAGNOSIS — N52.1 ERECTILE DYSFUNCTION DUE TO DISEASES CLASSIFIED ELSEWHERE: ICD-10-CM

## 2025-04-15 DIAGNOSIS — R30.0 DYSURIA: ICD-10-CM

## 2025-04-15 DIAGNOSIS — N40.1 BENIGN PROSTATIC HYPERPLASIA WITH LOWER URINARY TRACT SYMPMS: ICD-10-CM

## 2025-04-15 DIAGNOSIS — N13.8 BENIGN PROSTATIC HYPERPLASIA WITH LOWER URINARY TRACT SYMPMS: ICD-10-CM

## 2025-04-15 DIAGNOSIS — R31.0 GROSS HEMATURIA: ICD-10-CM

## 2025-04-15 PROCEDURE — 99214 OFFICE O/P EST MOD 30 MIN: CPT

## 2025-04-15 PROCEDURE — G2211 COMPLEX E/M VISIT ADD ON: CPT

## 2025-04-15 RX ORDER — SULFAMETHOXAZOLE AND TRIMETHOPRIM 800; 160 MG/1; MG/1
800-160 TABLET ORAL TWICE DAILY
Qty: 10 | Refills: 0 | Status: ACTIVE | COMMUNITY
Start: 2025-04-15 | End: 1900-01-01

## 2025-04-17 ENCOUNTER — APPOINTMENT (OUTPATIENT)
Age: 75
End: 2025-04-17

## 2025-04-17 LAB
APPEARANCE: CLEAR
BACTERIA: NEGATIVE /HPF
BILIRUBIN URINE: NEGATIVE
BLOOD URINE: NEGATIVE
CAST: 1 /LPF
COLOR: YELLOW
EPITHELIAL CELLS: 1 /HPF
GLUCOSE QUALITATIVE U: NEGATIVE MG/DL
KETONES URINE: NEGATIVE MG/DL
LEUKOCYTE ESTERASE URINE: ABNORMAL
MICROSCOPIC-UA: NORMAL
NITRITE URINE: NEGATIVE
PH URINE: 5.5
PROTEIN URINE: NEGATIVE MG/DL
PSA SERPL-MCNC: 1.13 NG/ML
RED BLOOD CELLS URINE: 0 /HPF
SPECIFIC GRAVITY URINE: 1.01
UROBILINOGEN URINE: 0.2 MG/DL
WHITE BLOOD CELLS URINE: 14 /HPF

## 2025-04-21 ENCOUNTER — APPOINTMENT (OUTPATIENT)
Age: 75
End: 2025-04-21

## 2025-04-21 LAB — URINE CYTOLOGY: NORMAL

## 2025-05-09 ENCOUNTER — APPOINTMENT (OUTPATIENT)
Dept: UROLOGY | Facility: CLINIC | Age: 75
End: 2025-05-09

## 2025-05-13 ENCOUNTER — APPOINTMENT (OUTPATIENT)
Dept: UROLOGY | Facility: CLINIC | Age: 75
End: 2025-05-13
Payer: MEDICARE

## 2025-05-13 ENCOUNTER — APPOINTMENT (OUTPATIENT)
Dept: CT IMAGING | Facility: IMAGING CENTER | Age: 75
End: 2025-05-13
Payer: MEDICARE

## 2025-05-13 ENCOUNTER — OUTPATIENT (OUTPATIENT)
Dept: OUTPATIENT SERVICES | Facility: HOSPITAL | Age: 75
LOS: 1 days | End: 2025-05-13
Payer: MEDICARE

## 2025-05-13 DIAGNOSIS — R35.0 FREQUENCY OF MICTURITION: ICD-10-CM

## 2025-05-13 DIAGNOSIS — R31.0 GROSS HEMATURIA: ICD-10-CM

## 2025-05-13 DIAGNOSIS — N40.1 BENIGN PROSTATIC HYPERPLASIA WITH LOWER URINARY TRACT SYMPMS: ICD-10-CM

## 2025-05-13 DIAGNOSIS — N13.8 BENIGN PROSTATIC HYPERPLASIA WITH LOWER URINARY TRACT SYMPMS: ICD-10-CM

## 2025-05-13 PROCEDURE — 52000 CYSTOURETHROSCOPY: CPT

## 2025-05-13 PROCEDURE — 74178 CT ABD&PLV WO CNTR FLWD CNTR: CPT | Mod: 26

## 2025-05-13 PROCEDURE — 74178 CT ABD&PLV WO CNTR FLWD CNTR: CPT

## 2025-05-14 DIAGNOSIS — N40.1 BENIGN PROSTATIC HYPERPLASIA WITH LOWER URINARY TRACT SYMPTOMS: ICD-10-CM

## 2025-05-14 DIAGNOSIS — R31.0 GROSS HEMATURIA: ICD-10-CM

## 2025-05-16 ENCOUNTER — APPOINTMENT (OUTPATIENT)
Age: 75
End: 2025-05-16

## 2025-06-24 ENCOUNTER — EMERGENCY (EMERGENCY)
Facility: HOSPITAL | Age: 75
LOS: 1 days | End: 2025-06-24
Attending: EMERGENCY MEDICINE
Payer: MEDICARE

## 2025-06-24 VITALS
RESPIRATION RATE: 18 BRPM | DIASTOLIC BLOOD PRESSURE: 79 MMHG | SYSTOLIC BLOOD PRESSURE: 147 MMHG | HEART RATE: 84 BPM | OXYGEN SATURATION: 98 % | TEMPERATURE: 98 F

## 2025-06-24 VITALS
HEART RATE: 77 BPM | RESPIRATION RATE: 16 BRPM | SYSTOLIC BLOOD PRESSURE: 150 MMHG | TEMPERATURE: 98 F | OXYGEN SATURATION: 100 % | WEIGHT: 203.93 LBS | DIASTOLIC BLOOD PRESSURE: 93 MMHG

## 2025-06-24 LAB
ALBUMIN SERPL ELPH-MCNC: 4.3 G/DL — SIGNIFICANT CHANGE UP (ref 3.3–5.2)
ALP SERPL-CCNC: 45 U/L — SIGNIFICANT CHANGE UP (ref 40–120)
ALT FLD-CCNC: 22 U/L — SIGNIFICANT CHANGE UP
ANION GAP SERPL CALC-SCNC: 15 MMOL/L — SIGNIFICANT CHANGE UP (ref 5–17)
APTT BLD: 25 SEC — LOW (ref 26.1–36.8)
AST SERPL-CCNC: 23 U/L — SIGNIFICANT CHANGE UP
BASOPHILS # BLD AUTO: 0.03 K/UL — SIGNIFICANT CHANGE UP (ref 0–0.2)
BASOPHILS NFR BLD AUTO: 0.3 % — SIGNIFICANT CHANGE UP (ref 0–2)
BILIRUB SERPL-MCNC: 0.7 MG/DL — SIGNIFICANT CHANGE UP (ref 0.4–2)
BUN SERPL-MCNC: 14.2 MG/DL — SIGNIFICANT CHANGE UP (ref 8–20)
CALCIUM SERPL-MCNC: 10 MG/DL — SIGNIFICANT CHANGE UP (ref 8.4–10.5)
CHLORIDE SERPL-SCNC: 103 MMOL/L — SIGNIFICANT CHANGE UP (ref 96–108)
CO2 SERPL-SCNC: 20 MMOL/L — LOW (ref 22–29)
CREAT SERPL-MCNC: 0.98 MG/DL — SIGNIFICANT CHANGE UP (ref 0.5–1.3)
EGFR: 81 ML/MIN/1.73M2 — SIGNIFICANT CHANGE UP
EGFR: 81 ML/MIN/1.73M2 — SIGNIFICANT CHANGE UP
EOSINOPHIL # BLD AUTO: 0.04 K/UL — SIGNIFICANT CHANGE UP (ref 0–0.5)
EOSINOPHIL NFR BLD AUTO: 0.4 % — SIGNIFICANT CHANGE UP (ref 0–6)
GLUCOSE SERPL-MCNC: 142 MG/DL — HIGH (ref 70–99)
HCT VFR BLD CALC: 47.9 % — SIGNIFICANT CHANGE UP (ref 39–50)
HGB BLD-MCNC: 16.3 G/DL — SIGNIFICANT CHANGE UP (ref 13–17)
IMM GRANULOCYTES # BLD AUTO: 0.05 K/UL — SIGNIFICANT CHANGE UP (ref 0–0.07)
IMM GRANULOCYTES NFR BLD AUTO: 0.6 % — SIGNIFICANT CHANGE UP (ref 0–0.9)
INR BLD: 0.98 RATIO — SIGNIFICANT CHANGE UP (ref 0.85–1.16)
LIDOCAIN IGE QN: 29 U/L — SIGNIFICANT CHANGE UP (ref 22–51)
LYMPHOCYTES # BLD AUTO: 1.43 K/UL — SIGNIFICANT CHANGE UP (ref 1–3.3)
LYMPHOCYTES NFR BLD AUTO: 15.8 % — SIGNIFICANT CHANGE UP (ref 13–44)
MAGNESIUM SERPL-MCNC: 2.2 MG/DL — SIGNIFICANT CHANGE UP (ref 1.6–2.6)
MCHC RBC-ENTMCNC: 31.4 PG — SIGNIFICANT CHANGE UP (ref 27–34)
MCHC RBC-ENTMCNC: 34 G/DL — SIGNIFICANT CHANGE UP (ref 32–36)
MCV RBC AUTO: 92.3 FL — SIGNIFICANT CHANGE UP (ref 80–100)
MONOCYTES # BLD AUTO: 0.5 K/UL — SIGNIFICANT CHANGE UP (ref 0–0.9)
MONOCYTES NFR BLD AUTO: 5.5 % — SIGNIFICANT CHANGE UP (ref 2–14)
NEUTROPHILS # BLD AUTO: 6.98 K/UL — SIGNIFICANT CHANGE UP (ref 1.8–7.4)
NEUTROPHILS NFR BLD AUTO: 77.4 % — HIGH (ref 43–77)
NRBC # BLD AUTO: 0 K/UL — SIGNIFICANT CHANGE UP (ref 0–0)
NRBC # FLD: 0 K/UL — SIGNIFICANT CHANGE UP (ref 0–0)
NRBC BLD AUTO-RTO: 0 /100 WBCS — SIGNIFICANT CHANGE UP (ref 0–0)
NT-PROBNP SERPL-SCNC: 60 PG/ML — SIGNIFICANT CHANGE UP (ref 0–300)
PLATELET # BLD AUTO: 240 K/UL — SIGNIFICANT CHANGE UP (ref 150–400)
PMV BLD: 9.5 FL — SIGNIFICANT CHANGE UP (ref 7–13)
POTASSIUM SERPL-MCNC: 4.4 MMOL/L — SIGNIFICANT CHANGE UP (ref 3.5–5.3)
POTASSIUM SERPL-SCNC: 4.4 MMOL/L — SIGNIFICANT CHANGE UP (ref 3.5–5.3)
PROT SERPL-MCNC: 7 G/DL — SIGNIFICANT CHANGE UP (ref 6.6–8.7)
PROTHROM AB SERPL-ACNC: 11.1 SEC — SIGNIFICANT CHANGE UP (ref 9.9–13.4)
RBC # BLD: 5.19 M/UL — SIGNIFICANT CHANGE UP (ref 4.2–5.8)
RBC # FLD: 13.2 % — SIGNIFICANT CHANGE UP (ref 10.3–14.5)
SODIUM SERPL-SCNC: 138 MMOL/L — SIGNIFICANT CHANGE UP (ref 135–145)
TROPONIN T, HIGH SENSITIVITY RESULT: 9 NG/L — SIGNIFICANT CHANGE UP (ref 0–51)
WBC # BLD: 9.03 K/UL — SIGNIFICANT CHANGE UP (ref 3.8–10.5)
WBC # FLD AUTO: 9.03 K/UL — SIGNIFICANT CHANGE UP (ref 3.8–10.5)

## 2025-06-24 PROCEDURE — 71275 CT ANGIOGRAPHY CHEST: CPT

## 2025-06-24 PROCEDURE — 85025 COMPLETE CBC W/AUTO DIFF WBC: CPT

## 2025-06-24 PROCEDURE — 71275 CT ANGIOGRAPHY CHEST: CPT | Mod: 26

## 2025-06-24 PROCEDURE — 70450 CT HEAD/BRAIN W/O DYE: CPT | Mod: 26

## 2025-06-24 PROCEDURE — 84484 ASSAY OF TROPONIN QUANT: CPT

## 2025-06-24 PROCEDURE — 83880 ASSAY OF NATRIURETIC PEPTIDE: CPT

## 2025-06-24 PROCEDURE — 70450 CT HEAD/BRAIN W/O DYE: CPT

## 2025-06-24 PROCEDURE — 85730 THROMBOPLASTIN TIME PARTIAL: CPT

## 2025-06-24 PROCEDURE — 99285 EMERGENCY DEPT VISIT HI MDM: CPT | Mod: 25

## 2025-06-24 PROCEDURE — 72125 CT NECK SPINE W/O DYE: CPT | Mod: 26

## 2025-06-24 PROCEDURE — 36415 COLL VENOUS BLD VENIPUNCTURE: CPT

## 2025-06-24 PROCEDURE — 85610 PROTHROMBIN TIME: CPT

## 2025-06-24 PROCEDURE — 73130 X-RAY EXAM OF HAND: CPT

## 2025-06-24 PROCEDURE — 73130 X-RAY EXAM OF HAND: CPT | Mod: 26,RT

## 2025-06-24 PROCEDURE — 99285 EMERGENCY DEPT VISIT HI MDM: CPT

## 2025-06-24 PROCEDURE — 96374 THER/PROPH/DIAG INJ IV PUSH: CPT | Mod: XU

## 2025-06-24 PROCEDURE — 83690 ASSAY OF LIPASE: CPT

## 2025-06-24 PROCEDURE — 83735 ASSAY OF MAGNESIUM: CPT

## 2025-06-24 PROCEDURE — 93005 ELECTROCARDIOGRAM TRACING: CPT

## 2025-06-24 PROCEDURE — 93010 ELECTROCARDIOGRAM REPORT: CPT

## 2025-06-24 PROCEDURE — 80053 COMPREHEN METABOLIC PANEL: CPT

## 2025-06-24 PROCEDURE — 71045 X-RAY EXAM CHEST 1 VIEW: CPT

## 2025-06-24 PROCEDURE — 72125 CT NECK SPINE W/O DYE: CPT

## 2025-06-24 PROCEDURE — 71045 X-RAY EXAM CHEST 1 VIEW: CPT | Mod: 26

## 2025-06-24 RX ORDER — METHOCARBAMOL 500 MG/1
1 TABLET, FILM COATED ORAL
Qty: 15 | Refills: 0
Start: 2025-06-24 | End: 2025-06-28

## 2025-06-24 RX ORDER — OXYCODONE HYDROCHLORIDE AND ACETAMINOPHEN 10; 325 MG/1; MG/1
1 TABLET ORAL ONCE
Refills: 0 | Status: DISCONTINUED | OUTPATIENT
Start: 2025-06-24 | End: 2025-06-24

## 2025-06-24 RX ORDER — ACETAMINOPHEN 500 MG/5ML
1000 LIQUID (ML) ORAL ONCE
Refills: 0 | Status: COMPLETED | OUTPATIENT
Start: 2025-06-24 | End: 2025-06-24

## 2025-06-24 RX ORDER — METHOCARBAMOL 500 MG/1
750 TABLET, FILM COATED ORAL ONCE
Refills: 0 | Status: COMPLETED | OUTPATIENT
Start: 2025-06-24 | End: 2025-06-24

## 2025-06-24 RX ORDER — OXYCODONE HYDROCHLORIDE 30 MG/1
1 TABLET ORAL
Qty: 12 | Refills: 0
Start: 2025-06-24 | End: 2025-06-26

## 2025-06-24 RX ORDER — OXYCODONE HYDROCHLORIDE 30 MG/1
5 TABLET ORAL ONCE
Refills: 0 | Status: DISCONTINUED | OUTPATIENT
Start: 2025-06-24 | End: 2025-06-24

## 2025-06-24 RX ADMIN — Medication 400 MILLIGRAM(S): at 09:25

## 2025-06-24 RX ADMIN — Medication 1000 MILLIGRAM(S): at 09:55

## 2025-06-24 RX ADMIN — METHOCARBAMOL 750 MILLIGRAM(S): 500 TABLET, FILM COATED ORAL at 14:36

## 2025-06-24 RX ADMIN — OXYCODONE HYDROCHLORIDE 5 MILLIGRAM(S): 30 TABLET ORAL at 12:16

## 2025-06-24 NOTE — ED PROVIDER NOTE - OBJECTIVE STATEMENT
74-year-old male past medical history of PE on Eliquis hypertension , HLD status post mechanical fall while walking.  As per wife, they were doing the morning exercise when  suddenly tripped and fell hitting his chest.  Patient denies any head strike neck pain or abdominal pain.  Patient complains of pain to the right fifth digit.

## 2025-06-24 NOTE — ED ADULT NURSE NOTE - OBJECTIVE STATEMENT
Pt received in B8R, wife present at bedside. Pt is a&ox3, presents with right sided chest pain after a trip and fall where he landed on the sidewalk on his chest. Pt did not lose consciousness, no head strike. Pt states that he is having trouble breathing but that is due to the pain on inspiration. Pt ambulated after incident, although states he feels dizzy at this time in bed. 20G IV placed in R ac, labs drawn and sent. Pt not offering any other complaints at this time. Has abrasions on elbows and knees from fall, also endorsing right pink finger pain. Did not take morning meds including eliquis, has not eaten since last night. Awaiting results and ct at this time. Care ongoing. Pt received in B8R, wife present at bedside. Pt is a&ox3, presents with right sided chest pain after a trip and fall where he landed on the sidewalk on his chest. Pt did not lose consciousness, no head strike. Pt states that he is having trouble breathing but that is due to the pain on inspiration. Pt ambulated after incident, although states he feels dizzy at this time in bed. 20G IV placed in R ac, labs drawn and sent. Pt not offering any other complaints at this time. Has abrasions on elbows and knees from fall, also endorsing right pink finger pain. Continuous pulse ox and cardiac monitoring in progress. Did not take morning meds including eliquis, has not eaten since last night. Awaiting results and ct at this time. Care ongoing.

## 2025-06-24 NOTE — ED ADULT NURSE NOTE - NSFALLHARMRISKINTERV_ED_ALL_ED

## 2025-06-24 NOTE — ED ADULT NURSE REASSESSMENT NOTE - NS ED NURSE REASSESS COMMENT FT1
Pt resting in bed at this time, vitals as noted. Continuous cardiac and pulse monitoring in progress. Medicated per orders, pt had minimal pain relief with initial medication. Awaiting ct results at this time.

## 2025-06-24 NOTE — ED ADULT TRIAGE NOTE - CHIEF COMPLAINT QUOTE
BIBEMS with c/o trip and fall while walking. denies headstrike/loc. landed on chest. c/o chest discomfort. EKG In progress.

## 2025-06-24 NOTE — ED PROVIDER NOTE - PATIENT PORTAL LINK FT
You can access the FollowMyHealth Patient Portal offered by [facility] by registering at the following website: [email or web address]. By joining [facility] FollowMyHealth portal, you will also be able to view your health information using other applications (apps) compatible with our system.

## 2025-06-24 NOTE — ED PROVIDER NOTE - NSFOLLOWUPINSTRUCTIONS_ED_ALL_ED_FT
Tylenol 975 every 6 hours for pain  Robaxin-750 milligrams 3 times a day for 5 days   oxycodone 5 mg by mouth every 6 hours for moderate to severe pain   follow-up with your primary care doctor in 3 to 5 days

## 2025-06-26 ENCOUNTER — TRANSCRIPTION ENCOUNTER (OUTPATIENT)
Age: 75
End: 2025-06-26

## 2025-06-26 ENCOUNTER — APPOINTMENT (OUTPATIENT)
Dept: AFTER HOURS CARE | Facility: EMERGENCY ROOM | Age: 75
End: 2025-06-26
Payer: MEDICARE

## 2025-06-26 PROCEDURE — 99215 OFFICE O/P EST HI 40 MIN: CPT | Mod: 2W

## 2025-06-27 DIAGNOSIS — I10 ESSENTIAL (PRIMARY) HYPERTENSION: ICD-10-CM

## 2025-06-27 DIAGNOSIS — R07.89 OTHER CHEST PAIN: ICD-10-CM

## 2025-06-27 DIAGNOSIS — M79.644 PAIN IN RIGHT FINGER(S): ICD-10-CM
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
## 2025-06-27 DIAGNOSIS — Z79.01 LONG TERM (CURRENT) USE OF ANTICOAGULANTS: ICD-10-CM

## 2025-06-27 DIAGNOSIS — Z86.711 PERSONAL HISTORY OF PULMONARY EMBOLISM: ICD-10-CM
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
## 2025-07-09 ENCOUNTER — APPOINTMENT (OUTPATIENT)
Dept: FAMILY MEDICINE | Facility: CLINIC | Age: 75
End: 2025-07-09
Payer: COMMERCIAL

## 2025-07-09 VITALS
SYSTOLIC BLOOD PRESSURE: 124 MMHG | HEIGHT: 70 IN | HEART RATE: 77 BPM | RESPIRATION RATE: 15 BRPM | TEMPERATURE: 98.4 F | BODY MASS INDEX: 28.06 KG/M2 | OXYGEN SATURATION: 98 % | DIASTOLIC BLOOD PRESSURE: 72 MMHG | WEIGHT: 196 LBS

## 2025-07-09 PROBLEM — Z91.81 STATUS POST FALL: Status: ACTIVE | Noted: 2025-07-09

## 2025-07-09 PROBLEM — S29.9XXS: Status: ACTIVE | Noted: 2025-07-09

## 2025-07-09 PROCEDURE — 99214 OFFICE O/P EST MOD 30 MIN: CPT

## 2025-07-09 PROCEDURE — G2211 COMPLEX E/M VISIT ADD ON: CPT | Mod: NC

## 2025-08-18 ENCOUNTER — RX RENEWAL (OUTPATIENT)
Age: 75
End: 2025-08-18

## 2025-08-20 ENCOUNTER — RX RENEWAL (OUTPATIENT)
Age: 75
End: 2025-08-20